# Patient Record
Sex: FEMALE | Race: WHITE | NOT HISPANIC OR LATINO | Employment: OTHER | URBAN - METROPOLITAN AREA
[De-identification: names, ages, dates, MRNs, and addresses within clinical notes are randomized per-mention and may not be internally consistent; named-entity substitution may affect disease eponyms.]

---

## 2025-07-05 ENCOUNTER — APPOINTMENT (OUTPATIENT)
Dept: RADIOLOGY | Facility: HOSPITAL | Age: OVER 89
DRG: 536 | End: 2025-07-05
Payer: MEDICARE

## 2025-07-05 ENCOUNTER — APPOINTMENT (OUTPATIENT)
Dept: CARDIOLOGY | Facility: HOSPITAL | Age: OVER 89
DRG: 536 | End: 2025-07-05
Payer: MEDICARE

## 2025-07-05 ENCOUNTER — HOSPITAL ENCOUNTER (OUTPATIENT)
Facility: HOSPITAL | Age: OVER 89
Setting detail: OBSERVATION
End: 2025-07-05
Attending: EMERGENCY MEDICINE | Admitting: INTERNAL MEDICINE
Payer: MEDICARE

## 2025-07-05 DIAGNOSIS — N17.9 AKI (ACUTE KIDNEY INJURY): ICD-10-CM

## 2025-07-05 DIAGNOSIS — I10 ESSENTIAL HYPERTENSION: ICD-10-CM

## 2025-07-05 DIAGNOSIS — R79.89 ELEVATED TROPONIN: ICD-10-CM

## 2025-07-05 DIAGNOSIS — S32.592A CLOSED FRACTURE OF MULTIPLE PUBIC RAMI, LEFT, INITIAL ENCOUNTER: Primary | ICD-10-CM

## 2025-07-05 LAB
ALBUMIN SERPL BCP-MCNC: 3.8 G/DL (ref 3.4–5)
ALP SERPL-CCNC: 46 U/L (ref 33–136)
ALT SERPL W P-5'-P-CCNC: 24 U/L (ref 7–45)
ANION GAP SERPL CALC-SCNC: 14 MMOL/L (ref 10–20)
APPEARANCE UR: CLEAR
AST SERPL W P-5'-P-CCNC: 30 U/L (ref 9–39)
BASOPHILS # BLD AUTO: 0.05 X10*3/UL (ref 0–0.1)
BASOPHILS NFR BLD AUTO: 0.6 %
BILIRUB SERPL-MCNC: 0.5 MG/DL (ref 0–1.2)
BILIRUB UR STRIP.AUTO-MCNC: NEGATIVE MG/DL
BNP SERPL-MCNC: 87 PG/ML (ref 0–99)
BUN SERPL-MCNC: 26 MG/DL (ref 6–23)
CALCIUM SERPL-MCNC: 8.2 MG/DL (ref 8.6–10.3)
CARDIAC TROPONIN I PNL SERPL HS: 16 NG/L (ref 0–13)
CARDIAC TROPONIN I PNL SERPL HS: 16 NG/L (ref 0–13)
CHLORIDE SERPL-SCNC: 106 MMOL/L (ref 98–107)
CK SERPL-CCNC: 214 U/L (ref 0–215)
CO2 SERPL-SCNC: 22 MMOL/L (ref 21–32)
COLOR UR: COLORLESS
CREAT SERPL-MCNC: 0.95 MG/DL (ref 0.5–1.05)
EGFRCR SERPLBLD CKD-EPI 2021: 55 ML/MIN/1.73M*2
EOSINOPHIL # BLD AUTO: 0.08 X10*3/UL (ref 0–0.4)
EOSINOPHIL NFR BLD AUTO: 0.9 %
ERYTHROCYTE [DISTWIDTH] IN BLOOD BY AUTOMATED COUNT: 12.4 % (ref 11.5–14.5)
GLUCOSE SERPL-MCNC: 84 MG/DL (ref 74–99)
GLUCOSE UR STRIP.AUTO-MCNC: NORMAL MG/DL
HCT VFR BLD AUTO: 33.5 % (ref 36–46)
HGB BLD-MCNC: 10.7 G/DL (ref 12–16)
IMM GRANULOCYTES # BLD AUTO: 0.12 X10*3/UL (ref 0–0.5)
IMM GRANULOCYTES NFR BLD AUTO: 1.3 % (ref 0–0.9)
KETONES UR STRIP.AUTO-MCNC: NEGATIVE MG/DL
LEUKOCYTE ESTERASE UR QL STRIP.AUTO: NEGATIVE
LYMPHOCYTES # BLD AUTO: 0.89 X10*3/UL (ref 0.8–3)
LYMPHOCYTES NFR BLD AUTO: 9.9 %
MAGNESIUM SERPL-MCNC: 2.06 MG/DL (ref 1.6–2.4)
MCH RBC QN AUTO: 31.3 PG (ref 26–34)
MCHC RBC AUTO-ENTMCNC: 31.9 G/DL (ref 32–36)
MCV RBC AUTO: 98 FL (ref 80–100)
MONOCYTES # BLD AUTO: 0.41 X10*3/UL (ref 0.05–0.8)
MONOCYTES NFR BLD AUTO: 4.6 %
NEUTROPHILS # BLD AUTO: 7.42 X10*3/UL (ref 1.6–5.5)
NEUTROPHILS NFR BLD AUTO: 82.7 %
NITRITE UR QL STRIP.AUTO: NEGATIVE
NRBC BLD-RTO: 0 /100 WBCS (ref 0–0)
PH UR STRIP.AUTO: 6.5 [PH]
PLATELET # BLD AUTO: 210 X10*3/UL (ref 150–450)
POTASSIUM SERPL-SCNC: 3.8 MMOL/L (ref 3.5–5.3)
PROT SERPL-MCNC: 6.7 G/DL (ref 6.4–8.2)
PROT UR STRIP.AUTO-MCNC: ABNORMAL MG/DL
RBC # BLD AUTO: 3.42 X10*6/UL (ref 4–5.2)
RBC # UR STRIP.AUTO: NEGATIVE MG/DL
RBC #/AREA URNS AUTO: NORMAL /HPF
SODIUM SERPL-SCNC: 138 MMOL/L (ref 136–145)
SP GR UR STRIP.AUTO: 1.01
UROBILINOGEN UR STRIP.AUTO-MCNC: NORMAL MG/DL
WBC # BLD AUTO: 9 X10*3/UL (ref 4.4–11.3)
WBC #/AREA URNS AUTO: NORMAL /HPF

## 2025-07-05 PROCEDURE — 93005 ELECTROCARDIOGRAM TRACING: CPT

## 2025-07-05 PROCEDURE — 85025 COMPLETE CBC W/AUTO DIFF WBC: CPT | Performed by: EMERGENCY MEDICINE

## 2025-07-05 PROCEDURE — 73564 X-RAY EXAM KNEE 4 OR MORE: CPT | Mod: 50

## 2025-07-05 PROCEDURE — 82550 ASSAY OF CK (CPK): CPT | Performed by: EMERGENCY MEDICINE

## 2025-07-05 PROCEDURE — 71260 CT THORAX DX C+: CPT | Mod: FOREIGN READ | Performed by: RADIOLOGY

## 2025-07-05 PROCEDURE — 70450 CT HEAD/BRAIN W/O DYE: CPT

## 2025-07-05 PROCEDURE — 2500000001 HC RX 250 WO HCPCS SELF ADMINISTERED DRUGS (ALT 637 FOR MEDICARE OP): Performed by: INTERNAL MEDICINE

## 2025-07-05 PROCEDURE — 36415 COLL VENOUS BLD VENIPUNCTURE: CPT | Performed by: EMERGENCY MEDICINE

## 2025-07-05 PROCEDURE — 84484 ASSAY OF TROPONIN QUANT: CPT | Performed by: EMERGENCY MEDICINE

## 2025-07-05 PROCEDURE — 99285 EMERGENCY DEPT VISIT HI MDM: CPT | Mod: 25 | Performed by: EMERGENCY MEDICINE

## 2025-07-05 PROCEDURE — 72125 CT NECK SPINE W/O DYE: CPT | Performed by: RADIOLOGY

## 2025-07-05 PROCEDURE — 2500000004 HC RX 250 GENERAL PHARMACY W/ HCPCS (ALT 636 FOR OP/ED): Performed by: INTERNAL MEDICINE

## 2025-07-05 PROCEDURE — 83880 ASSAY OF NATRIURETIC PEPTIDE: CPT | Performed by: EMERGENCY MEDICINE

## 2025-07-05 PROCEDURE — 71046 X-RAY EXAM CHEST 2 VIEWS: CPT

## 2025-07-05 PROCEDURE — 81001 URINALYSIS AUTO W/SCOPE: CPT | Performed by: EMERGENCY MEDICINE

## 2025-07-05 PROCEDURE — 72125 CT NECK SPINE W/O DYE: CPT

## 2025-07-05 PROCEDURE — 96374 THER/PROPH/DIAG INJ IV PUSH: CPT

## 2025-07-05 PROCEDURE — 96375 TX/PRO/DX INJ NEW DRUG ADDON: CPT

## 2025-07-05 PROCEDURE — 73552 X-RAY EXAM OF FEMUR 2/>: CPT | Mod: LT

## 2025-07-05 PROCEDURE — 73502 X-RAY EXAM HIP UNI 2-3 VIEWS: CPT | Mod: LT

## 2025-07-05 PROCEDURE — 74177 CT ABD & PELVIS W/CONTRAST: CPT | Mod: FOREIGN READ | Performed by: RADIOLOGY

## 2025-07-05 PROCEDURE — 99223 1ST HOSP IP/OBS HIGH 75: CPT | Performed by: ORTHOPAEDIC SURGERY

## 2025-07-05 PROCEDURE — 71260 CT THORAX DX C+: CPT

## 2025-07-05 PROCEDURE — 80053 COMPREHEN METABOLIC PANEL: CPT | Performed by: EMERGENCY MEDICINE

## 2025-07-05 PROCEDURE — 73610 X-RAY EXAM OF ANKLE: CPT | Mod: 50

## 2025-07-05 PROCEDURE — 70450 CT HEAD/BRAIN W/O DYE: CPT | Performed by: RADIOLOGY

## 2025-07-05 PROCEDURE — 73502 X-RAY EXAM HIP UNI 2-3 VIEWS: CPT | Mod: LEFT SIDE | Performed by: RADIOLOGY

## 2025-07-05 PROCEDURE — 71046 X-RAY EXAM CHEST 2 VIEWS: CPT | Mod: FOREIGN READ | Performed by: RADIOLOGY

## 2025-07-05 PROCEDURE — 83735 ASSAY OF MAGNESIUM: CPT | Performed by: EMERGENCY MEDICINE

## 2025-07-05 PROCEDURE — 2500000004 HC RX 250 GENERAL PHARMACY W/ HCPCS (ALT 636 FOR OP/ED): Performed by: EMERGENCY MEDICINE

## 2025-07-05 PROCEDURE — 1200000002 HC GENERAL ROOM WITH TELEMETRY DAILY

## 2025-07-05 PROCEDURE — 73552 X-RAY EXAM OF FEMUR 2/>: CPT | Mod: LEFT SIDE | Performed by: RADIOLOGY

## 2025-07-05 PROCEDURE — 73564 X-RAY EXAM KNEE 4 OR MORE: CPT | Mod: BILATERAL PROCEDURE | Performed by: RADIOLOGY

## 2025-07-05 PROCEDURE — 2550000001 HC RX 255 CONTRASTS: Performed by: EMERGENCY MEDICINE

## 2025-07-05 PROCEDURE — 73610 X-RAY EXAM OF ANKLE: CPT | Mod: BILATERAL PROCEDURE | Performed by: RADIOLOGY

## 2025-07-05 RX ORDER — HYDROMORPHONE HYDROCHLORIDE 0.2 MG/ML
0.2 INJECTION INTRAMUSCULAR; INTRAVENOUS; SUBCUTANEOUS ONCE
Status: COMPLETED | OUTPATIENT
Start: 2025-07-05 | End: 2025-07-05

## 2025-07-05 RX ORDER — HYDRALAZINE HYDROCHLORIDE 20 MG/ML
10 INJECTION INTRAMUSCULAR; INTRAVENOUS ONCE
Status: COMPLETED | OUTPATIENT
Start: 2025-07-05 | End: 2025-07-05

## 2025-07-05 RX ORDER — ACETAMINOPHEN 650 MG/1
650 SUPPOSITORY RECTAL EVERY 4 HOURS PRN
Status: ACTIVE | OUTPATIENT
Start: 2025-07-05

## 2025-07-05 RX ORDER — BISOPROLOL FUMARATE 5 MG/1
5 TABLET, FILM COATED ORAL DAILY
Status: DISPENSED | OUTPATIENT
Start: 2025-07-05

## 2025-07-05 RX ORDER — PANTOPRAZOLE SODIUM 40 MG/1
40 TABLET, DELAYED RELEASE ORAL
Status: ACTIVE | OUTPATIENT
Start: 2025-07-06

## 2025-07-05 RX ORDER — LATANOPROST 50 UG/ML
1 SOLUTION/ DROPS OPHTHALMIC NIGHTLY
Status: DISPENSED | OUTPATIENT
Start: 2025-07-05

## 2025-07-05 RX ORDER — VIT C/E/ZN/COPPR/LUTEIN/ZEAXAN 250MG-90MG
25 CAPSULE ORAL DAILY
Status: ON HOLD | COMMUNITY

## 2025-07-05 RX ORDER — OXYCODONE HYDROCHLORIDE 5 MG/1
5 TABLET ORAL EVERY 6 HOURS PRN
Refills: 0 | Status: DISPENSED | OUTPATIENT
Start: 2025-07-05

## 2025-07-05 RX ORDER — ONDANSETRON HYDROCHLORIDE 2 MG/ML
4 INJECTION, SOLUTION INTRAVENOUS EVERY 8 HOURS PRN
Status: ACTIVE | OUTPATIENT
Start: 2025-07-05

## 2025-07-05 RX ORDER — LATANOPROST 50 UG/ML
1 SOLUTION/ DROPS OPHTHALMIC NIGHTLY
Status: ON HOLD | COMMUNITY

## 2025-07-05 RX ORDER — HYDROXYCHLOROQUINE SULFATE 200 MG/1
200 TABLET, FILM COATED ORAL DAILY
Status: ON HOLD | COMMUNITY

## 2025-07-05 RX ORDER — COLCHICINE 0.6 MG/1
0.6 TABLET ORAL DAILY
Status: DISPENSED | OUTPATIENT
Start: 2025-07-05

## 2025-07-05 RX ORDER — LISINOPRIL 20 MG/1
40 TABLET ORAL DAILY
Status: DISPENSED | OUTPATIENT
Start: 2025-07-06

## 2025-07-05 RX ORDER — CALCIUM CARBONATE 500(1250)
1 TABLET ORAL DAILY
Status: ON HOLD | COMMUNITY
Start: 2017-04-25

## 2025-07-05 RX ORDER — AMLODIPINE BESYLATE 10 MG/1
10 TABLET ORAL
Status: ON HOLD | COMMUNITY

## 2025-07-05 RX ORDER — LISINOPRIL 40 MG/1
40 TABLET ORAL DAILY
Status: ON HOLD | COMMUNITY

## 2025-07-05 RX ORDER — HYDROXYCHLOROQUINE SULFATE 200 MG/1
200 TABLET, FILM COATED ORAL DAILY
Status: DISPENSED | OUTPATIENT
Start: 2025-07-05

## 2025-07-05 RX ORDER — PANTOPRAZOLE SODIUM 40 MG/10ML
40 INJECTION, POWDER, LYOPHILIZED, FOR SOLUTION INTRAVENOUS
Status: DISPENSED | OUTPATIENT
Start: 2025-07-06

## 2025-07-05 RX ORDER — HEPARIN SODIUM 5000 [USP'U]/ML
5000 INJECTION, SOLUTION INTRAVENOUS; SUBCUTANEOUS EVERY 8 HOURS SCHEDULED
Status: DISCONTINUED | OUTPATIENT
Start: 2025-07-05 | End: 2025-07-05

## 2025-07-05 RX ORDER — ALENDRONATE SODIUM 70 MG/1
70 TABLET ORAL
Status: ON HOLD | COMMUNITY
Start: 2017-04-25

## 2025-07-05 RX ORDER — HYDRALAZINE HYDROCHLORIDE 20 MG/ML
10 INJECTION INTRAMUSCULAR; INTRAVENOUS EVERY 6 HOURS PRN
Status: DISPENSED | OUTPATIENT
Start: 2025-07-05

## 2025-07-05 RX ORDER — RISPERIDONE 0.5 MG/1
0.5 TABLET, ORALLY DISINTEGRATING ORAL DAILY
Status: ON HOLD | COMMUNITY

## 2025-07-05 RX ORDER — ACETAMINOPHEN 160 MG/5ML
650 SOLUTION ORAL EVERY 4 HOURS PRN
Status: ACTIVE | OUTPATIENT
Start: 2025-07-05

## 2025-07-05 RX ORDER — AMLODIPINE BESYLATE 10 MG/1
10 TABLET ORAL DAILY
Status: DISPENSED | OUTPATIENT
Start: 2025-07-05

## 2025-07-05 RX ORDER — ACETAMINOPHEN 325 MG/1
650 TABLET ORAL EVERY 4 HOURS PRN
Status: ACTIVE | OUTPATIENT
Start: 2025-07-05

## 2025-07-05 RX ORDER — ONDANSETRON 4 MG/1
4 TABLET, FILM COATED ORAL EVERY 8 HOURS PRN
Status: ACTIVE | OUTPATIENT
Start: 2025-07-05

## 2025-07-05 RX ORDER — HEPARIN SODIUM 5000 [USP'U]/ML
5000 INJECTION, SOLUTION INTRAVENOUS; SUBCUTANEOUS EVERY 12 HOURS SCHEDULED
Status: DISPENSED | OUTPATIENT
Start: 2025-07-05

## 2025-07-05 RX ADMIN — OXYCODONE HYDROCHLORIDE 5 MG: 5 TABLET ORAL at 21:47

## 2025-07-05 RX ADMIN — LATANOPROST 1 DROP: 50 SOLUTION/ DROPS OPHTHALMIC at 22:44

## 2025-07-05 RX ADMIN — COLCHICINE 0.6 MG: 0.6 TABLET, FILM COATED ORAL at 22:29

## 2025-07-05 RX ADMIN — HYDRALAZINE HYDROCHLORIDE 10 MG: 20 INJECTION INTRAMUSCULAR; INTRAVENOUS at 19:02

## 2025-07-05 RX ADMIN — BISOPROLOL FUMARATE 5 MG: 5 TABLET, FILM COATED ORAL at 22:29

## 2025-07-05 RX ADMIN — AMLODIPINE BESYLATE 10 MG: 10 TABLET ORAL at 21:44

## 2025-07-05 RX ADMIN — HEPARIN SODIUM 5000 UNITS: 5000 INJECTION, SOLUTION INTRAVENOUS; SUBCUTANEOUS at 22:29

## 2025-07-05 RX ADMIN — HYDROMORPHONE HYDROCHLORIDE 0.2 MG: 0.2 INJECTION, SOLUTION INTRAMUSCULAR; INTRAVENOUS; SUBCUTANEOUS at 15:10

## 2025-07-05 RX ADMIN — IOHEXOL 100 ML: 350 INJECTION, SOLUTION INTRAVENOUS at 15:57

## 2025-07-05 RX ADMIN — HYDROXYCHLOROQUINE SULFATE 200 MG: 200 TABLET, FILM COATED ORAL at 22:34

## 2025-07-05 SDOH — SOCIAL STABILITY: SOCIAL INSECURITY: DO YOU FEEL ANYONE HAS EXPLOITED OR TAKEN ADVANTAGE OF YOU FINANCIALLY OR OF YOUR PERSONAL PROPERTY?: UNABLE TO ASSESS

## 2025-07-05 SDOH — SOCIAL STABILITY: SOCIAL INSECURITY: WITHIN THE LAST YEAR, HAVE YOU BEEN HUMILIATED OR EMOTIONALLY ABUSED IN OTHER WAYS BY YOUR PARTNER OR EX-PARTNER?: NO

## 2025-07-05 SDOH — SOCIAL STABILITY: SOCIAL INSECURITY: ABUSE: ADULT

## 2025-07-05 SDOH — SOCIAL STABILITY: SOCIAL INSECURITY: HAS ANYONE EVER THREATENED TO HURT YOUR FAMILY OR YOUR PETS?: UNABLE TO ASSESS

## 2025-07-05 SDOH — ECONOMIC STABILITY: FOOD INSECURITY: WITHIN THE PAST 12 MONTHS, YOU WORRIED THAT YOUR FOOD WOULD RUN OUT BEFORE YOU GOT THE MONEY TO BUY MORE.: NEVER TRUE

## 2025-07-05 SDOH — SOCIAL STABILITY: SOCIAL INSECURITY: WITHIN THE LAST YEAR, HAVE YOU BEEN AFRAID OF YOUR PARTNER OR EX-PARTNER?: NO

## 2025-07-05 SDOH — ECONOMIC STABILITY: FOOD INSECURITY: WITHIN THE PAST 12 MONTHS, THE FOOD YOU BOUGHT JUST DIDN'T LAST AND YOU DIDN'T HAVE MONEY TO GET MORE.: NEVER TRUE

## 2025-07-05 SDOH — SOCIAL STABILITY: SOCIAL INSECURITY
WITHIN THE LAST YEAR, HAVE YOU BEEN KICKED, HIT, SLAPPED, OR OTHERWISE PHYSICALLY HURT BY YOUR PARTNER OR EX-PARTNER?: NO

## 2025-07-05 SDOH — ECONOMIC STABILITY: INCOME INSECURITY: IN THE PAST 12 MONTHS HAS THE ELECTRIC, GAS, OIL, OR WATER COMPANY THREATENED TO SHUT OFF SERVICES IN YOUR HOME?: NO

## 2025-07-05 SDOH — SOCIAL STABILITY: SOCIAL INSECURITY: HAVE YOU HAD ANY THOUGHTS OF HARMING ANYONE ELSE?: UNABLE TO ASSESS

## 2025-07-05 SDOH — SOCIAL STABILITY: SOCIAL INSECURITY
WITHIN THE LAST YEAR, HAVE YOU BEEN RAPED OR FORCED TO HAVE ANY KIND OF SEXUAL ACTIVITY BY YOUR PARTNER OR EX-PARTNER?: NO

## 2025-07-05 SDOH — SOCIAL STABILITY: SOCIAL INSECURITY: HAVE YOU HAD THOUGHTS OF HARMING ANYONE ELSE?: NO

## 2025-07-05 SDOH — SOCIAL STABILITY: SOCIAL INSECURITY: DOES ANYONE TRY TO KEEP YOU FROM HAVING/CONTACTING OTHER FRIENDS OR DOING THINGS OUTSIDE YOUR HOME?: UNABLE TO ASSESS

## 2025-07-05 SDOH — SOCIAL STABILITY: SOCIAL INSECURITY: ARE THERE ANY APPARENT SIGNS OF INJURIES/BEHAVIORS THAT COULD BE RELATED TO ABUSE/NEGLECT?: NO

## 2025-07-05 SDOH — SOCIAL STABILITY: SOCIAL INSECURITY: ARE YOU OR HAVE YOU BEEN THREATENED OR ABUSED PHYSICALLY, EMOTIONALLY, OR SEXUALLY BY ANYONE?: UNABLE TO ASSESS

## 2025-07-05 SDOH — SOCIAL STABILITY: SOCIAL INSECURITY: WERE YOU ABLE TO COMPLETE ALL THE BEHAVIORAL HEALTH SCREENINGS?: NO

## 2025-07-05 SDOH — SOCIAL STABILITY: SOCIAL INSECURITY: DO YOU FEEL UNSAFE GOING BACK TO THE PLACE WHERE YOU ARE LIVING?: UNABLE TO ASSESS

## 2025-07-05 ASSESSMENT — COGNITIVE AND FUNCTIONAL STATUS - GENERAL
TURNING FROM BACK TO SIDE WHILE IN FLAT BAD: A LOT
TOILETING: A LOT
MOBILITY SCORE: 12
WALKING IN HOSPITAL ROOM: A LOT
PATIENT BASELINE BEDBOUND: NO
MOVING FROM LYING ON BACK TO SITTING ON SIDE OF FLAT BED WITH BEDRAILS: A LOT
DAILY ACTIVITIY SCORE: 16
EATING MEALS: A LITTLE
MOVING TO AND FROM BED TO CHAIR: A LOT
CLIMB 3 TO 5 STEPS WITH RAILING: A LOT
DRESSING REGULAR LOWER BODY CLOTHING: A LOT
HELP NEEDED FOR BATHING: A LITTLE
PERSONAL GROOMING: A LITTLE
DRESSING REGULAR UPPER BODY CLOTHING: A LITTLE
STANDING UP FROM CHAIR USING ARMS: A LOT

## 2025-07-05 ASSESSMENT — PAIN - FUNCTIONAL ASSESSMENT
PAIN_FUNCTIONAL_ASSESSMENT: 0-10
PAIN_FUNCTIONAL_ASSESSMENT: 0-10

## 2025-07-05 ASSESSMENT — ACTIVITIES OF DAILY LIVING (ADL)
PATIENT'S MEMORY ADEQUATE TO SAFELY COMPLETE DAILY ACTIVITIES?: NO
GROOMING: NEEDS ASSISTANCE
BATHING: NEEDS ASSISTANCE
TOILETING: NEEDS ASSISTANCE
HEARING - RIGHT EAR: FUNCTIONAL
JUDGMENT_ADEQUATE_SAFELY_COMPLETE_DAILY_ACTIVITIES: NO
DRESSING YOURSELF: NEEDS ASSISTANCE
FEEDING YOURSELF: NEEDS ASSISTANCE
LACK_OF_TRANSPORTATION: PATIENT DECLINED
WALKS IN HOME: NEEDS ASSISTANCE
ADEQUATE_TO_COMPLETE_ADL: YES
ASSISTIVE_DEVICE: WALKER
HEARING - LEFT EAR: FUNCTIONAL

## 2025-07-05 ASSESSMENT — ENCOUNTER SYMPTOMS
ENDOCRINE NEGATIVE: 1
ACTIVITY CHANGE: 1
PSYCHIATRIC NEGATIVE: 1
ALLERGIC/IMMUNOLOGIC NEGATIVE: 1
HEMATOLOGIC/LYMPHATIC NEGATIVE: 1
CARDIOVASCULAR NEGATIVE: 1
APPETITE CHANGE: 1
GASTROINTESTINAL NEGATIVE: 1
RESPIRATORY NEGATIVE: 1
EYES NEGATIVE: 1
FATIGUE: 1

## 2025-07-05 ASSESSMENT — LIFESTYLE VARIABLES
HOW MANY STANDARD DRINKS CONTAINING ALCOHOL DO YOU HAVE ON A TYPICAL DAY: PATIENT UNABLE TO ANSWER
SKIP TO QUESTIONS 9-10: 0
AUDIT-C TOTAL SCORE: -1
HOW OFTEN DO YOU HAVE 6 OR MORE DRINKS ON ONE OCCASION: PATIENT UNABLE TO ANSWER
AUDIT-C TOTAL SCORE: -1
HOW OFTEN DO YOU HAVE A DRINK CONTAINING ALCOHOL: PATIENT UNABLE TO ANSWER

## 2025-07-05 ASSESSMENT — PATIENT HEALTH QUESTIONNAIRE - PHQ9
SUM OF ALL RESPONSES TO PHQ9 QUESTIONS 1 & 2: 0
2. FEELING DOWN, DEPRESSED OR HOPELESS: NOT AT ALL
1. LITTLE INTEREST OR PLEASURE IN DOING THINGS: NOT AT ALL

## 2025-07-05 ASSESSMENT — PAIN SCALES - GENERAL
PAINLEVEL_OUTOF10: 2
PAINLEVEL_OUTOF10: 10 - WORST POSSIBLE PAIN

## 2025-07-05 ASSESSMENT — PAIN DESCRIPTION - PROGRESSION: CLINICAL_PROGRESSION: NOT CHANGED

## 2025-07-05 NOTE — CONSULTS
ORTHOPAEDIC CONSULTATION     History Of Present Illness  Belén Chen is a 96 y.o. female with past medical history of dementia presenting after unwitnessed fall at nursing home.  At baseline, patient is oriented to name and does sometimes answer questions and follows commands.  Patient previously in day endorsed pain localized to the left hip.  Denies pain in the bilateral knees or ankles.  States that she ambulates, unable to elaborate further.    Review of Systems: 12 point ROS negative unless stated in HPI    Past Medical History  She has no past medical history on file.    Surgical History  She has no past surgical history on file.     Social History  She has no history on file for tobacco use, alcohol use, and drug use.    Family History  Family History[1]     Allergies  Patient has no allergy information on record.    Review of Systems  12 point ROS negative unless stated in HPI     Physical Exam  GEN - NAD, resting comfortably in hospital bed  HEENT - MMM, EOMI, NCAT  CV - RRR by peripheral palpation, limbs wwp  PULM - NWOB  NEURO - EPSTEIN spontaneously, CNs II - XII grossly intact  PSYCH - Appropriate mood and affect      Pelvis:  - Nontender over left hip  - Skin intact  - No bony deformity, skin tenting, bruising  - 5/5 motor and SILT in all distributions in BL LE      A full secondary survey of all four extremities was performed and the significant orthopedic findings are noted above.     Last Recorded Vitals  Blood pressure (!) 192/73, pulse (!) 102, temperature 36.5 °C (97.7 °F), resp. rate 16, SpO2 96%.    Imaging:  X-ray of the pelvis demonstrates left superior and inferior pubic rami fractures.  Bilateral total hip arthroplasties.  Prior left greater trochanteric nonunion, chronic appearing.    CT the pelvis demonstrates the above with no associated sacral fracture.     Assessment/Plan   96-year-old female presenting after unwitnessed fall with left incomplete LC 1 pelvic ring fracture.    Plan:  -  No acute orthopaedic surgical intervention, fractures are stable and will heal uneventfully over 6 weeks  - WBAT bilateral lower extremities with walker as tolerated  - DVT ppx per primary  - Patient to follow up in clinic with Dr. Kovacs in 6 weeks or as needed, call [phone] to schedule appointment after discharge.    Consult seen and staffed within 30 minutes of notification.    Consult discussed with attending, Dr. Bethanie Agudelo MD, PGY-3  Orthopaedic Surgery   Available via [employer] Chat    I saw and evaluated the patient. I personally obtained the key and critical portions of the history and physical exam or was physically present for key and critical portions performed by the resident/fellow. I reviewed the resident/fellow's documentation and discussed the patient with the resident/fellow. I agree with the resident/fellow's medical decision making as documented in the note.   Briefly, this is a 96 year old woman who was found down behind the door at living facility and brought into ED.  On exam, foot pulses intact, able to DF/PF/wiggle toes.  No pain over medial or lateral malleoli bilaterally.  Full ankle motion bilaterally, No pain over total knee replacements.  Mild groin pain on left.  No pain with logroll hip.  No upper extremity pain.      DATA review:  I personally reviewed the xray images and reports of the left ankle: NO acute fracture of medial malleolus (chronic calcification in deltoid attachment, not an acute fracture line and patient not tender clinically and not swollen there), NO acute fracture of distal fibula (chronic fibular ossicle present which is well rounded and not an acute fracture line, patient with no pain over fibula clinically).      I personally reviewed the patient's xray reports and images of her hips showing previous nonunion greater troch on left with intact prosthesis, R NONA in place, no periprosthetic fracture.      I personally reviewed the xray and CT  images and reports of pelvis showing nondisplaced pubic rami fracture sup and inf and CT scan no sacral involvement.      I personally reviewed the patient's xray images and reports of both knees showing intact total knee arthroplasties, no periprosthetic fracture and no loosening.    Assessment: left superior and inferior pubic rami fractures, arthroplasties in place with no acute complications, bilateral ankles with chronic ossification med mall and distal fibular ossicle (clinically asymptomatic).  Recommend WBAT BLE with PT and OT evaluation.  No surgery needed.  Rami fractures will heal over 6-8 weeks.      Hermelinda Kovacs MD          [1] No family history on file.

## 2025-07-05 NOTE — ED TRIAGE NOTES
Unwitnessed fall at facility. Alert to self at baseline. Complaining of left hip pain. Hx bilateral hip replacements

## 2025-07-05 NOTE — ED PROVIDER NOTES
HPI   Chief Complaint   Patient presents with   • Fall       HPI        Patient History   Medical History[1]  Surgical History[2]  Family History[3]  Social History[4]    Physical Exam   ED Triage Vitals [07/05/25 1251]   Temperature Heart Rate Respirations BP   36.5 °C (97.7 °F) 71 18 (!) 209/81      Pulse Ox Temp src Heart Rate Source Patient Position   97 % -- -- --      BP Location FiO2 (%)     -- --       Physical Exam      ED Course & MDM   ED Course as of 07/05/25 1441   Sat Jul 05, 2025   1348 Attempted to contact patient's nursing home, was on hold for approximately 15 minutes without answer.  Will attempt to recontact nursing facility. [BR]      ED Course User Index  [BR] Umer Walker MD                 No data recorded     [place] Coma Scale Score: 14 (07/05/25 1251 : Kala Verdin, RORY)                           Medical Decision Making      Procedure  Procedures         [1]  No past medical history on file.  [2]  No past surgical history on file.  [3]  No family history on file.  [4]  Social History  Tobacco Use   • Smoking status: Not on file   • Smokeless tobacco: Not on file   Substance Use Topics   • Alcohol use: Not on file   • Drug use: Not on file      Palpations: Abdomen is soft.      Tenderness: There is no abdominal tenderness. There is no right CVA tenderness, left CVA tenderness, guarding or rebound.   Musculoskeletal:      Right shoulder: Normal.      Left shoulder: Normal.      Right upper arm: Normal.      Left upper arm: Normal.      Right elbow: Normal.      Left elbow: Normal.      Right forearm: Normal.      Left forearm: Normal.      Right wrist: Normal.      Left wrist: Normal.      Cervical back: Normal and full passive range of motion without pain.      Thoracic back: Normal.      Lumbar back: Normal.      Right hip: Normal.      Left hip: Tenderness present.      Right upper leg: Normal.      Left upper leg: Tenderness present.      Right lower leg: No edema.      Left lower leg: No edema.      Right ankle: Tenderness present.      Left ankle: Tenderness present.   Skin:     General: Skin is warm and dry.   Neurological:      General: No focal deficit present.      Mental Status: She is alert. Mental status is at baseline.      GCS: GCS eye subscore is 4. GCS verbal subscore is 5. GCS motor subscore is 6.      Cranial Nerves: No cranial nerve deficit.      Sensory: No sensory deficit.      Motor: No weakness, tremor or abnormal muscle tone.   Psychiatric:         Mood and Affect: Mood normal.           ED Course & MDM   ED Course as of 07/08/25 2145   Sat Jul 05, 2025   1348 Attempted to contact patient's nursing home, was on hold for approximately 15 minutes without answer.  Will attempt to recontact nursing facility. [BR]      ED Course User Index  [BR] Umer Walker MD         Diagnoses as of 07/08/25 2145   Closed fracture of multiple pubic rami, left, initial encounter   Elevated troponin   Essential hypertension                 No data recorded     [place] Coma Scale Score: 14 (07/05/25 1251 : Kaal Verdin RN)                           Medical Decision Making  ECG interpreted by me: Sinus rhythm, rate 98, , QTc 45.  Right bundle  branch block, no ST changes suggestive of ischemia      Patient presenting after an MI's fall at her nursing facility.  On examination has left hip tenderness, bilateral ankle tenderness, some left lower quadrant tenderness on exam.  Otherwise reassuring normal range vitals, appears to be neurovascularly intact in all 4 extremities.  Will assess for acute traumatic injury CT head, C-spine, chest abdomen pelvis, left femur/hip x-ray, bilateral ankle x-rays.  Will assess for precipitating dysrhythmia/metabolic abnormality/infectious etiology with cell count, CMP, troponin, ECG, urinalysis.  Patient's workup remarkable for superior and inferior left pubic rami fractures, otherwise largely reassuring.  Given patient's age and falls and concern for pain control/higher likelihood of fall, patient will be admitted to medicine for further evaluation and management, did consult orthopedic surgery prior to admission.  Admitted in stable condition.        Procedure  Procedures       [1] No past medical history on file.  [2] No past surgical history on file.  [3] No family history on file.  [4]   Social History  Tobacco Use    Smoking status: Not on file    Smokeless tobacco: Not on file   Substance Use Topics    Alcohol use: Not on file    Drug use: Not on file        Umer Walker MD  07/08/25 3238

## 2025-07-06 VITALS
SYSTOLIC BLOOD PRESSURE: 146 MMHG | HEART RATE: 77 BPM | OXYGEN SATURATION: 94 % | WEIGHT: 101.5 LBS | HEIGHT: 61 IN | RESPIRATION RATE: 18 BRPM | DIASTOLIC BLOOD PRESSURE: 61 MMHG | BODY MASS INDEX: 19.16 KG/M2 | TEMPERATURE: 98.2 F

## 2025-07-06 PROBLEM — S32.592A PUBIC RAMUS FRACTURE, LEFT, CLOSED, INITIAL ENCOUNTER (MULTI): Status: ACTIVE | Noted: 2025-07-06

## 2025-07-06 LAB
ANION GAP SERPL CALC-SCNC: 14 MMOL/L (ref 10–20)
BUN SERPL-MCNC: 27 MG/DL (ref 6–23)
CALCIUM SERPL-MCNC: 8.4 MG/DL (ref 8.6–10.3)
CHLORIDE SERPL-SCNC: 107 MMOL/L (ref 98–107)
CO2 SERPL-SCNC: 22 MMOL/L (ref 21–32)
CREAT SERPL-MCNC: 1.2 MG/DL (ref 0.5–1.05)
EGFRCR SERPLBLD CKD-EPI 2021: 42 ML/MIN/1.73M*2
ERYTHROCYTE [DISTWIDTH] IN BLOOD BY AUTOMATED COUNT: 12.7 % (ref 11.5–14.5)
GLUCOSE SERPL-MCNC: 102 MG/DL (ref 74–99)
HCT VFR BLD AUTO: 32.6 % (ref 36–46)
HGB BLD-MCNC: 10.5 G/DL (ref 12–16)
MCH RBC QN AUTO: 31 PG (ref 26–34)
MCHC RBC AUTO-ENTMCNC: 32.2 G/DL (ref 32–36)
MCV RBC AUTO: 96 FL (ref 80–100)
NRBC BLD-RTO: 0 /100 WBCS (ref 0–0)
PLATELET # BLD AUTO: 202 X10*3/UL (ref 150–450)
POTASSIUM SERPL-SCNC: 4.6 MMOL/L (ref 3.5–5.3)
RBC # BLD AUTO: 3.39 X10*6/UL (ref 4–5.2)
SODIUM SERPL-SCNC: 138 MMOL/L (ref 136–145)
WBC # BLD AUTO: 13.1 X10*3/UL (ref 4.4–11.3)

## 2025-07-06 PROCEDURE — 80048 BASIC METABOLIC PNL TOTAL CA: CPT | Performed by: INTERNAL MEDICINE

## 2025-07-06 PROCEDURE — 36415 COLL VENOUS BLD VENIPUNCTURE: CPT | Performed by: INTERNAL MEDICINE

## 2025-07-06 PROCEDURE — 2500000004 HC RX 250 GENERAL PHARMACY W/ HCPCS (ALT 636 FOR OP/ED): Performed by: INTERNAL MEDICINE

## 2025-07-06 PROCEDURE — G0378 HOSPITAL OBSERVATION PER HR: HCPCS

## 2025-07-06 PROCEDURE — 99232 SBSQ HOSP IP/OBS MODERATE 35: CPT | Performed by: INTERNAL MEDICINE

## 2025-07-06 PROCEDURE — 85027 COMPLETE CBC AUTOMATED: CPT | Performed by: INTERNAL MEDICINE

## 2025-07-06 PROCEDURE — 2500000001 HC RX 250 WO HCPCS SELF ADMINISTERED DRUGS (ALT 637 FOR MEDICARE OP): Performed by: INTERNAL MEDICINE

## 2025-07-06 PROCEDURE — 96372 THER/PROPH/DIAG INJ SC/IM: CPT | Performed by: INTERNAL MEDICINE

## 2025-07-06 PROCEDURE — 97161 PT EVAL LOW COMPLEX 20 MIN: CPT | Mod: GP

## 2025-07-06 RX ADMIN — HYDRALAZINE HYDROCHLORIDE 10 MG: 20 INJECTION INTRAMUSCULAR; INTRAVENOUS at 01:07

## 2025-07-06 RX ADMIN — HYDRALAZINE HYDROCHLORIDE 10 MG: 20 INJECTION INTRAMUSCULAR; INTRAVENOUS at 16:06

## 2025-07-06 RX ADMIN — LATANOPROST 1 DROP: 50 SOLUTION/ DROPS OPHTHALMIC at 21:08

## 2025-07-06 RX ADMIN — HEPARIN SODIUM 5000 UNITS: 5000 INJECTION, SOLUTION INTRAVENOUS; SUBCUTANEOUS at 08:58

## 2025-07-06 RX ADMIN — BISOPROLOL FUMARATE 5 MG: 5 TABLET, FILM COATED ORAL at 09:29

## 2025-07-06 RX ADMIN — PANTOPRAZOLE SODIUM 40 MG: 40 INJECTION, POWDER, FOR SOLUTION INTRAVENOUS at 06:18

## 2025-07-06 RX ADMIN — HEPARIN SODIUM 5000 UNITS: 5000 INJECTION, SOLUTION INTRAVENOUS; SUBCUTANEOUS at 21:08

## 2025-07-06 RX ADMIN — LISINOPRIL 40 MG: 20 TABLET ORAL at 08:58

## 2025-07-06 RX ADMIN — AMLODIPINE BESYLATE 10 MG: 10 TABLET ORAL at 09:29

## 2025-07-06 RX ADMIN — COLCHICINE 0.6 MG: 0.6 TABLET, FILM COATED ORAL at 09:29

## 2025-07-06 RX ADMIN — OXYCODONE HYDROCHLORIDE 5 MG: 5 TABLET ORAL at 03:51

## 2025-07-06 RX ADMIN — HYDROXYCHLOROQUINE SULFATE 200 MG: 200 TABLET, FILM COATED ORAL at 09:29

## 2025-07-06 ASSESSMENT — PAIN SCALES - PAIN ASSESSMENT IN ADVANCED DEMENTIA (PAINAD)
BODYLANGUAGE: RELAXED
CONSOLABILITY: NO NEED TO CONSOLE
BREATHING: NORMAL
CONSOLABILITY: UNABLE TO CONSOLE, DISTRACT OR REASSURE
TOTALSCORE: 7
NEGVOCALIZATION: REPEATED TROUBLED CALLING OUT, LOUD MOANING/GROANING, CRYING
BREATHING: NORMAL
BODYLANGUAGE: TENSE, DISTRESSED PACING, FIDGETING
NEGVOCALIZATION: OCCASIONAL MOAN/GROAN, LOW SPEECH, NEGATIVE/DISAPPROVING QUALITY
FACIALEXPRESSION: FACIAL GRIMACING
TOTALSCORE: MEDICATION (SEE MAR)
FACIALEXPRESSION: SMILING OR INEXPRESSIVE
TOTALSCORE: 1

## 2025-07-06 ASSESSMENT — COGNITIVE AND FUNCTIONAL STATUS - GENERAL
STANDING UP FROM CHAIR USING ARMS: A LOT
DRESSING REGULAR LOWER BODY CLOTHING: A LOT
WALKING IN HOSPITAL ROOM: TOTAL
DAILY ACTIVITIY SCORE: 16
STANDING UP FROM CHAIR USING ARMS: A LOT
DRESSING REGULAR UPPER BODY CLOTHING: A LOT
DAILY ACTIVITIY SCORE: 14
MOVING FROM LYING ON BACK TO SITTING ON SIDE OF FLAT BED WITH BEDRAILS: A LOT
CLIMB 3 TO 5 STEPS WITH RAILING: TOTAL
STANDING UP FROM CHAIR USING ARMS: A LOT
HELP NEEDED FOR BATHING: A LITTLE
MOVING FROM LYING ON BACK TO SITTING ON SIDE OF FLAT BED WITH BEDRAILS: A LOT
WALKING IN HOSPITAL ROOM: A LOT
PERSONAL GROOMING: A LITTLE
MOVING TO AND FROM BED TO CHAIR: A LOT
WALKING IN HOSPITAL ROOM: A LOT
EATING MEALS: A LITTLE
CLIMB 3 TO 5 STEPS WITH RAILING: TOTAL
PERSONAL GROOMING: A LITTLE
MOVING FROM LYING ON BACK TO SITTING ON SIDE OF FLAT BED WITH BEDRAILS: A LOT
TOILETING: A LOT
MOBILITY SCORE: 12
HELP NEEDED FOR BATHING: A LOT
MOBILITY SCORE: 11
TURNING FROM BACK TO SIDE WHILE IN FLAT BAD: A LOT
TURNING FROM BACK TO SIDE WHILE IN FLAT BAD: A LOT
DRESSING REGULAR LOWER BODY CLOTHING: A LOT
EATING MEALS: A LITTLE
MOVING TO AND FROM BED TO CHAIR: TOTAL
DRESSING REGULAR UPPER BODY CLOTHING: A LITTLE
MOVING TO AND FROM BED TO CHAIR: A LOT
MOBILITY SCORE: 9
CLIMB 3 TO 5 STEPS WITH RAILING: A LOT
TOILETING: A LOT
TURNING FROM BACK TO SIDE WHILE IN FLAT BAD: A LOT

## 2025-07-06 ASSESSMENT — PAIN - FUNCTIONAL ASSESSMENT
PAIN_FUNCTIONAL_ASSESSMENT: 0-10
PAIN_FUNCTIONAL_ASSESSMENT: PAINAD (PAIN ASSESSMENT IN ADVANCED DEMENTIA SCALE)
PAIN_FUNCTIONAL_ASSESSMENT: WONG-BAKER FACES

## 2025-07-06 ASSESSMENT — PAIN SCALES - GENERAL
PAINLEVEL_OUTOF10: 0 - NO PAIN
PAINLEVEL_OUTOF10: 0 - NO PAIN

## 2025-07-06 NOTE — H&P
History Of Present Illness  Belén Chen is a 96 y.o. female with a past medical history of hypertension, metabolic encephalopathy, dementia who presented to [facility]note 3] from a nursing facility after an unwitnessed fall.  X-rays of the pelvis and CT of the pelvis demonstrate left superior and inferior pubic rami fractures.  Bilateral total hip arthroplasties prior left greater trochanteric nonunion, chronic appearing     Past Medical History  Osteoarthritis  Cancer  Difficulty walking  Edema  Glaucoma both eyes  Gout  Hypertension  Metabolic encephalopathy  Onychomycosis  Osteoporosis  Pain in her feet  Pseudophakia both eyes    Surgical History  Bilateral total hip arthroplasties     Social History  She has no history on file for tobacco use, alcohol use, and drug use.    Family History  CAD     Allergies  Patient has no known allergies.    Review of Systems   Constitutional:  Positive for activity change, appetite change and fatigue.   HENT: Negative.     Eyes: Negative.    Respiratory: Negative.     Cardiovascular: Negative.    Gastrointestinal: Negative.    Endocrine: Negative.    Genitourinary:  Positive for pelvic pain.   Musculoskeletal:  Positive for gait problem.        Pelvic pain    Skin: Negative.    Allergic/Immunologic: Negative.    Hematological: Negative.    Psychiatric/Behavioral: Negative.     All other systems reviewed and are negative.       Physical Exam  Vitals and nursing note reviewed.   Constitutional:       Appearance: Normal appearance.      Comments: She follows simple commands   HENT:      Head: Normocephalic.      Right Ear: External ear normal.      Left Ear: External ear normal.      Nose: Nose normal.      Mouth/Throat:      Mouth: Mucous membranes are dry.      Pharynx: Oropharynx is clear.   Eyes:      Extraocular Movements: Extraocular movements intact.      Conjunctiva/sclera: Conjunctivae normal.      Pupils: Pupils are equal, round, and reactive to light.    Cardiovascular:      Rate and Rhythm: Normal rate and regular rhythm.   Pulmonary:      Effort: Pulmonary effort is normal.      Breath sounds: Normal breath sounds.   Abdominal:      General: Abdomen is flat. Bowel sounds are normal.      Palpations: Abdomen is soft.   Musculoskeletal:         General: Normal range of motion.      Comments: Hips are nontender   Skin:     General: Skin is warm and dry.   Neurological:      General: No focal deficit present.      Mental Status: She is alert. Mental status is at baseline.   Psychiatric:         Mood and Affect: Mood normal.         Behavior: Behavior normal.          Last Recorded Vitals  Blood pressure 174/62, pulse 89, temperature 36.5 °C (97.7 °F), resp. rate 18, SpO2 95%.    Relevant Results  Meds:  Scheduled medications  Scheduled Medications[1]  Continuous medications  Continuous Medications[2]  PRN medications  PRN Medications[3]   No current outpatient medications     Labs:  Results for orders placed or performed during the hospital encounter of 07/05/25 (from the past 24 hours)   CBC and Auto Differential   Result Value Ref Range    WBC 9.0 4.4 - 11.3 x10*3/uL    nRBC 0.0 0.0 - 0.0 /100 WBCs    RBC 3.42 (L) 4.00 - 5.20 x10*6/uL    Hemoglobin 10.7 (L) 12.0 - 16.0 g/dL    Hematocrit 33.5 (L) 36.0 - 46.0 %    MCV 98 80 - 100 fL    MCH 31.3 26.0 - 34.0 pg    MCHC 31.9 (L) 32.0 - 36.0 g/dL    RDW 12.4 11.5 - 14.5 %    Platelets 210 150 - 450 x10*3/uL    Neutrophils % 82.7 40.0 - 80.0 %    Immature Granulocytes %, Automated 1.3 (H) 0.0 - 0.9 %    Lymphocytes % 9.9 13.0 - 44.0 %    Monocytes % 4.6 2.0 - 10.0 %    Eosinophils % 0.9 0.0 - 6.0 %    Basophils % 0.6 0.0 - 2.0 %    Neutrophils Absolute 7.42 (H) 1.60 - 5.50 x10*3/uL    Immature Granulocytes Absolute, Automated 0.12 0.00 - 0.50 x10*3/uL    Lymphocytes Absolute 0.89 0.80 - 3.00 x10*3/uL    Monocytes Absolute 0.41 0.05 - 0.80 x10*3/uL    Eosinophils Absolute 0.08 0.00 - 0.40 x10*3/uL    Basophils Absolute  0.05 0.00 - 0.10 x10*3/uL   Magnesium   Result Value Ref Range    Magnesium 2.06 1.60 - 2.40 mg/dL   Comprehensive metabolic panel   Result Value Ref Range    Glucose 84 74 - 99 mg/dL    Sodium 138 136 - 145 mmol/L    Potassium 3.8 3.5 - 5.3 mmol/L    Chloride 106 98 - 107 mmol/L    Bicarbonate 22 21 - 32 mmol/L    Anion Gap 14 10 - 20 mmol/L    Urea Nitrogen 26 (H) 6 - 23 mg/dL    Creatinine 0.95 0.50 - 1.05 mg/dL    eGFR 55 (L) >60 mL/min/1.73m*2    Calcium 8.2 (L) 8.6 - 10.3 mg/dL    Albumin 3.8 3.4 - 5.0 g/dL    Alkaline Phosphatase 46 33 - 136 U/L    Total Protein 6.7 6.4 - 8.2 g/dL    AST 30 9 - 39 U/L    Bilirubin, Total 0.5 0.0 - 1.2 mg/dL    ALT 24 7 - 45 U/L   Creatine Kinase   Result Value Ref Range    Creatine Kinase 214 0 - 215 U/L   Troponin I, High Sensitivity, Initial   Result Value Ref Range    Troponin I, High Sensitivity 16 (H) 0 - 13 ng/L   B-type natriuretic peptide   Result Value Ref Range    BNP 87 0 - 99 pg/mL   Urinalysis with Reflex Culture and Microscopic   Result Value Ref Range    Color, Urine Colorless (N) Light-Yellow, Yellow, Dark-Yellow    Appearance, Urine Clear Clear    Specific Gravity, Urine 1.008 1.005 - 1.035    pH, Urine 6.5 5.0, 5.5, 6.0, 6.5, 7.0, 7.5, 8.0    Protein, Urine 20 (TRACE) NEGATIVE, 10 (TRACE), 20 (TRACE) mg/dL    Glucose, Urine Normal Normal mg/dL    Blood, Urine NEGATIVE NEGATIVE mg/dL    Ketones, Urine NEGATIVE NEGATIVE mg/dL    Bilirubin, Urine NEGATIVE NEGATIVE mg/dL    Urobilinogen, Urine Normal Normal mg/dL    Nitrite, Urine NEGATIVE NEGATIVE    Leukocyte Esterase, Urine NEGATIVE NEGATIVE   Troponin, High Sensitivity, 1 Hour   Result Value Ref Range    Troponin I, High Sensitivity 16 (H) 0 - 13 ng/L   Urinalysis Microscopic   Result Value Ref Range    WBC, Urine 1-5 1-5, NONE /HPF    RBC, Urine NONE NONE, 1-2, 3-5 /HPF      Imaging:  Imaging  CT chest abdomen pelvis w IV contrast  Result Date: 7/5/2025  Acute, traumatic, nondisplaced fractures of the left  superior and inferior pubic bones. No additional acute thoracic, abdominal, or pelvic trauma identified. Multiple bilateral pulmonary nodules measuring up to 5 mm.  Follow-up per Fleischner Society guidelines. Bilateral thyroid nodules and cysts measuring up to 1.3 cm. Nonemergent thyroid ultrasound is recommended for further characterization. Moderate-sized sliding-type hiatal hernia. Mild pulmonary edema. Atherosclerosis with suspected stenoses of the origins of the SMA and bilateral renal arteries with mild bilateral renal cortical thinning. Sigmoid diverticulosis. Additional chronic changes as detailed in the body of the report. Signed by Dada Montelongo    CT cervical spine wo IV contrast  Result Date: 7/5/2025  No acute cervical vertebral displacement or acute displaced fracture. Multilevel productive/degenerative changes with multilevel spondylolisthesis, as detailed above, similar to 09/18/2023.   MACRO: None.   Signed by: Elva Miller 7/5/2025 4:50 PM Dictation workstation:   [id number]    CT head wo IV contrast  Result Date: 7/5/2025  No acute intracranial hemorrhage or mass-effect.   MACRO: None.   Signed by: Elva Miller 7/5/2025 4:46 PM Dictation workstation:   [id number]    XR ankle bilateral complete minimum 3 views  Result Date: 7/5/2025  Acute avulsion fracture of the inferior portion of the medial malleolus of the left ankle. Bilateral plantar fasciitis. Signed by James Esteves MD    XR knee 4+ views bilateral  Result Date: 7/5/2025  1.No acute traumatic bony abnormalities on x-ray examination of the bilateral knees. 2.Bilateral knee prostheses in place. 3.Soft tissue calcifications adjacent to the medial femoral condyles. These appear chronic, possibly mild Tony-Stieda calcifications. Signed by Anny Wen DO    XR femur left 2+ views  Result Date: 7/5/2025  1. Left hip and left knee arthroplasties. 2. Displaced lesser trochanter off the proximal femur, likely chronic. Signed by Denys  MD [facility]    XR hip left with pelvis when performed 2 or 3 views  Result Date: 7/5/2025  1. Acute or subacute left superior and inferior pubic rami fractures. 2. Bilateral hip arthroplasties. 3. Lesser trochanter fragment suggesting chronic age. Signed by Denys Wall MD    XR chest 2 views  Result Date: 7/5/2025  Mild enlargement of the cardiac silhouette with mild subsegmental atelectasis in the left mid and lower lung. The remaining lungs appear free of dense consolidation. Signed by Anny Wen DO      Cardiology, Vascular, and Other Imaging  No other imaging results found for the past 2 days       Assessment/Plan   Unwitnessed mechanical fall at the nursing home resulting in left superior and inferior pubic rami fractures  Weightbearing as tolerated per Ortho  Orthopedic consultation  Pain control    Hypertensive Urgency  Plan:  Restart amlodipine 10 mg orally daily  Zebeta 5 mg orally daily  Hydralazine as needed  Lisinopril 40 mg orally daily    Gout  Plan:  Colchicine 0.6 mg daily    DVT prophylaxis  Plan:  Heparin 5000 units subcutaneously every 8 hours  SCDs    I spent 60 minutes in the professional and overall care of this patient.      Bipin Quick DO                       [1] amLODIPine, 10 mg, oral, Daily  bisoprolol, 5 mg, oral, Daily  colchicine, 0.6 mg, oral, Daily  heparin (porcine), 5,000 Units, subcutaneous, q8h  hydroxychloroquine, 200 mg, oral, Daily  latanoprost, 1 drop, Both Eyes, Nightly  [START ON 7/6/2025] pantoprazole, 40 mg, oral, Daily before breakfast   Or  [START ON 7/6/2025] pantoprazole, 40 mg, intravenous, Daily before breakfast    [2]    [3] PRN medications: acetaminophen **OR** acetaminophen **OR** acetaminophen, ondansetron **OR** ondansetron, oxyCODONE

## 2025-07-06 NOTE — CARE PLAN
The patient's goals for the shift include      The clinical goals for the shift include maintain safety this shift      Problem: Pain - Adult  Goal: Verbalizes/displays adequate comfort level or baseline comfort level  Outcome: Progressing     Problem: Safety - Adult  Goal: Free from fall injury  Outcome: Progressing     Problem: Discharge Planning  Goal: Discharge to home or other facility with appropriate resources  Outcome: Progressing     Problem: Chronic Conditions and Co-morbidities  Goal: Patient's chronic conditions and co-morbidity symptoms are monitored and maintained or improved  Outcome: Progressing

## 2025-07-06 NOTE — PROGRESS NOTES
Belén Chen is a 96 y.o. female on day 1 of admission presenting with Closed fracture of multiple pubic rami, left, initial encounter.      Subjective   Pt seen and examined.        Objective     Last Recorded Vitals  /57   Pulse 70   Temp 36.1 °C (97 °F)   Resp 16   Wt 46 kg (101 lb 8 oz)   SpO2 95%   Intake/Output last 3 Shifts:  No intake or output data in the 24 hours ending 07/06/25 1211    Admission Weight  Weight: 46 kg (101 lb 8 oz) (07/05/25 2148)    Daily Weight  07/05/25 : 46 kg (101 lb 8 oz)      Physical Exam  Constitutional:       Appearance: Normal appearance.      Comments: She follows simple commands   HENT:      Head: Normocephalic.      Right Ear: External ear normal.      Left Ear: External ear normal.      Nose: Nose normal.      Mouth/Throat:      Mouth: Mucous membranes are dry.      Pharynx: Oropharynx is clear.   Eyes:      Extraocular Movements: Extraocular movements intact.      Conjunctiva/sclera: Conjunctivae normal.      Pupils: Pupils are equal, round, and reactive to light.   Cardiovascular:      Rate and Rhythm: Normal rate and regular rhythm.   Pulmonary:      Effort: Pulmonary effort is normal.      Breath sounds: Normal breath sounds.   Abdominal:      General: Abdomen is flat. Bowel sounds are normal.      Palpations: Abdomen is soft.   Musculoskeletal:         General: Normal range of motion.      Comments: Hips are nontender   Skin:     General: Skin is warm and dry.   Neurological:      General: No focal deficit present.      Mental Status: She is alert. Mental status is at baseline.   Psychiatric:         Mood and Affect: Mood normal.         Behavior: Behavior normal.   Relevant Results  Results for orders placed or performed during the hospital encounter of 07/05/25 (from the past 24 hours)   CBC and Auto Differential   Result Value Ref Range    WBC 9.0 4.4 - 11.3 x10*3/uL    nRBC 0.0 0.0 - 0.0 /100 WBCs    RBC 3.42 (L) 4.00 - 5.20 x10*6/uL    Hemoglobin  10.7 (L) 12.0 - 16.0 g/dL    Hematocrit 33.5 (L) 36.0 - 46.0 %    MCV 98 80 - 100 fL    MCH 31.3 26.0 - 34.0 pg    MCHC 31.9 (L) 32.0 - 36.0 g/dL    RDW 12.4 11.5 - 14.5 %    Platelets 210 150 - 450 x10*3/uL    Neutrophils % 82.7 40.0 - 80.0 %    Immature Granulocytes %, Automated 1.3 (H) 0.0 - 0.9 %    Lymphocytes % 9.9 13.0 - 44.0 %    Monocytes % 4.6 2.0 - 10.0 %    Eosinophils % 0.9 0.0 - 6.0 %    Basophils % 0.6 0.0 - 2.0 %    Neutrophils Absolute 7.42 (H) 1.60 - 5.50 x10*3/uL    Immature Granulocytes Absolute, Automated 0.12 0.00 - 0.50 x10*3/uL    Lymphocytes Absolute 0.89 0.80 - 3.00 x10*3/uL    Monocytes Absolute 0.41 0.05 - 0.80 x10*3/uL    Eosinophils Absolute 0.08 0.00 - 0.40 x10*3/uL    Basophils Absolute 0.05 0.00 - 0.10 x10*3/uL   Magnesium   Result Value Ref Range    Magnesium 2.06 1.60 - 2.40 mg/dL   Comprehensive metabolic panel   Result Value Ref Range    Glucose 84 74 - 99 mg/dL    Sodium 138 136 - 145 mmol/L    Potassium 3.8 3.5 - 5.3 mmol/L    Chloride 106 98 - 107 mmol/L    Bicarbonate 22 21 - 32 mmol/L    Anion Gap 14 10 - 20 mmol/L    Urea Nitrogen 26 (H) 6 - 23 mg/dL    Creatinine 0.95 0.50 - 1.05 mg/dL    eGFR 55 (L) >60 mL/min/1.73m*2    Calcium 8.2 (L) 8.6 - 10.3 mg/dL    Albumin 3.8 3.4 - 5.0 g/dL    Alkaline Phosphatase 46 33 - 136 U/L    Total Protein 6.7 6.4 - 8.2 g/dL    AST 30 9 - 39 U/L    Bilirubin, Total 0.5 0.0 - 1.2 mg/dL    ALT 24 7 - 45 U/L   Creatine Kinase   Result Value Ref Range    Creatine Kinase 214 0 - 215 U/L   Troponin I, High Sensitivity, Initial   Result Value Ref Range    Troponin I, High Sensitivity 16 (H) 0 - 13 ng/L   B-type natriuretic peptide   Result Value Ref Range    BNP 87 0 - 99 pg/mL   Urinalysis with Reflex Culture and Microscopic   Result Value Ref Range    Color, Urine Colorless (N) Light-Yellow, Yellow, Dark-Yellow    Appearance, Urine Clear Clear    Specific Gravity, Urine 1.008 1.005 - 1.035    pH, Urine 6.5 5.0, 5.5, 6.0, 6.5, 7.0, 7.5, 8.0     Protein, Urine 20 (TRACE) NEGATIVE, 10 (TRACE), 20 (TRACE) mg/dL    Glucose, Urine Normal Normal mg/dL    Blood, Urine NEGATIVE NEGATIVE mg/dL    Ketones, Urine NEGATIVE NEGATIVE mg/dL    Bilirubin, Urine NEGATIVE NEGATIVE mg/dL    Urobilinogen, Urine Normal Normal mg/dL    Nitrite, Urine NEGATIVE NEGATIVE    Leukocyte Esterase, Urine NEGATIVE NEGATIVE   Troponin, High Sensitivity, 1 Hour   Result Value Ref Range    Troponin I, High Sensitivity 16 (H) 0 - 13 ng/L   Urinalysis Microscopic   Result Value Ref Range    WBC, Urine 1-5 1-5, NONE /HPF    RBC, Urine NONE NONE, 1-2, 3-5 /HPF   CBC   Result Value Ref Range    WBC 13.1 (H) 4.4 - 11.3 x10*3/uL    nRBC 0.0 0.0 - 0.0 /100 WBCs    RBC 3.39 (L) 4.00 - 5.20 x10*6/uL    Hemoglobin 10.5 (L) 12.0 - 16.0 g/dL    Hematocrit 32.6 (L) 36.0 - 46.0 %    MCV 96 80 - 100 fL    MCH 31.0 26.0 - 34.0 pg    MCHC 32.2 32.0 - 36.0 g/dL    RDW 12.7 11.5 - 14.5 %    Platelets 202 150 - 450 x10*3/uL   Basic metabolic panel   Result Value Ref Range    Glucose 102 (H) 74 - 99 mg/dL    Sodium 138 136 - 145 mmol/L    Potassium 4.6 3.5 - 5.3 mmol/L    Chloride 107 98 - 107 mmol/L    Bicarbonate 22 21 - 32 mmol/L    Anion Gap 14 10 - 20 mmol/L    Urea Nitrogen 27 (H) 6 - 23 mg/dL    Creatinine 1.20 (H) 0.50 - 1.05 mg/dL    eGFR 42 (L) >60 mL/min/1.73m*2    Calcium 8.4 (L) 8.6 - 10.3 mg/dL        CT head wo IV contrast   Final Result   No acute intracranial hemorrhage or mass-effect.        MACRO:   None.        Signed by: Elva Miller 7/5/2025 4:46 PM   Dictation workstation:   [id number]      CT cervical spine wo IV contrast   Final Result   No acute cervical vertebral displacement or acute displaced fracture.   Multilevel productive/degenerative changes with multilevel   spondylolisthesis, as detailed above, similar to 09/18/2023.        MACRO:   None.        Signed by: Elva Miller 7/5/2025 4:50 PM   Dictation workstation:   [id number]      CT chest abdomen pelvis w IV contrast    Final Result   Acute, traumatic, nondisplaced fractures of the left superior and   inferior pubic bones.   No additional acute thoracic, abdominal, or pelvic trauma identified.   Multiple bilateral pulmonary nodules measuring up to 5 mm.  Follow-up   per Fleischner Society guidelines.   Bilateral thyroid nodules and cysts measuring up to 1.3 cm.    Nonemergent thyroid ultrasound is recommended for further   characterization.   Moderate-sized sliding-type hiatal hernia.   Mild pulmonary edema.   Atherosclerosis with suspected stenoses of the origins of the SMA and   bilateral renal arteries with mild bilateral renal cortical thinning.   Sigmoid diverticulosis.   Additional chronic changes as detailed in the body of the report.   Signed by Dada Montelongo      XR chest 2 views   Final Result   Mild enlargement of the cardiac silhouette with mild subsegmental   atelectasis in the left mid and lower lung. The remaining lungs appear   free of dense consolidation.   Signed by Anny Wen,       XR knee 4+ views bilateral   Final Result   1.No acute traumatic bony abnormalities on x-ray examination of   the bilateral knees.   2.Bilateral knee prostheses in place.   3.Soft tissue calcifications adjacent to the medial femoral   condyles. These appear chronic, possibly mild Tony-Stieda   calcifications.   Signed by Anny Wen,       XR femur left 2+ views   Final Result   1. Left hip and left knee arthroplasties.   2. Displaced lesser trochanter off the proximal femur, likely chronic.   Signed by Denys Wall MD      XR hip left with pelvis when performed 2 or 3 views   Final Result   1. Acute or subacute left superior and inferior pubic rami fractures.   2. Bilateral hip arthroplasties.   3. Lesser trochanter fragment suggesting chronic age.   Signed by Denys Wall MD      XR ankle bilateral complete minimum 3 views   Final Result   Acute avulsion fracture of the inferior portion of the medial   malleolus of the  left ankle. Bilateral plantar fasciitis.   Signed by James Esteves MD          Scheduled medications  Scheduled Medications[1]  Continuous medications  Continuous Medications[2]  PRN medications  PRN Medications[3]         Assessment/Plan                  Assessment & Plan  Closed fracture of multiple pubic rami, left, initial encounter      Unwitnessed mechanical fall at the nursing home resulting in left superior and inferior pubic rami fractures  Weightbearing as tolerated per Ortho  Orthopedic consultation  Pain control     Hypertensive Urgency  Plan:  Restart amlodipine 10 mg orally daily  Zebeta 5 mg orally daily  Hydralazine as needed  Lisinopril 40 mg orally daily     Gout  Plan:  Colchicine 0.6 mg daily     DVT prophylaxis  Plan:  Heparin 5000 units subcutaneously every 8 hours  DIANNEs        Shabbir Perla MD           [1] amLODIPine, 10 mg, oral, Daily  bisoprolol, 5 mg, oral, Daily  colchicine, 0.6 mg, oral, Daily  heparin (porcine), 5,000 Units, subcutaneous, q12h DAMON  hydroxychloroquine, 200 mg, oral, Daily  latanoprost, 1 drop, Both Eyes, Nightly  lisinopril, 40 mg, oral, Daily  pantoprazole, 40 mg, oral, Daily before breakfast   Or  pantoprazole, 40 mg, intravenous, Daily before breakfast    [2]    [3] PRN medications: acetaminophen **OR** acetaminophen **OR** acetaminophen, hydrALAZINE, ondansetron **OR** ondansetron, oxyCODONE

## 2025-07-06 NOTE — PROGRESS NOTES
Physical Therapy    Physical Therapy Evaluation    Patient Name: Belén Chen  MRN: [id number]  Today's Date: 7/6/2025   Time Calculation  Start Time: 1321  Stop Time: 1341  Time Calculation (min): 20 min  711/711-A    Assessment/Plan   PT Assessment  PT Assessment Results: Decreased strength, Decreased endurance, Impaired balance, Decreased mobility, Decreased cognition, Decreased safety awareness  Rehab Prognosis: Fair  Barriers to Discharge Home: Cognition needs, Physical needs, Caregiver assistance  Caregiver Assistance: Caregiver assistance needed per identified barriers - however, level of patient's required assistance exceeds assistance available at home  Cognition Needs: 24hr supervision for safety awareness needed, Recollection or understanding of home exercise program limited, Insight of patient limited regarding functional ability/needs, Cognition-related high falls risk  Physical Needs: 24hr mobility assistance needed, 24hr ADL assistance needed, High falls risk due to function or environment  Evaluation/Treatment Tolerance: Patient tolerated treatment well  Medical Staff Made Aware: Yes  Strengths: Housing layout, Premorbid level of function, Support of Caregivers  Barriers to Participation: Comorbidities, Ability to acquire knowledge  End of Session Communication: Bedside nurse  Assessment Comment: Pt presents with weakness, decreased ambulation and transfers, and severe unsteadiness due to L pubic rami fx; can benefit from skilled PT intervention to assist with discharge planning and address the aforementioned issues to enable the pt to return to their prior level of function, which was independent with ww.  End of Session Patient Position: Bed, 3 rail up, Alarm on  IP OR SWING BED PT PLAN  Inpatient or Swing Bed: Inpatient  PT Plan  Treatment/Interventions: Bed mobility, Transfer training, Gait training, Balance training, Neuromuscular re-education, Strengthening, Endurance training, Therapeutic  exercise, Therapeutic activity, Home exercise program, Postural re-education  PT Plan: Ongoing PT  PT Frequency: 3 times per week (For this acute inpatient hospitalization.)  PT Discharge Recommendations: Moderate intensity level of continued care (Based on current functional status and rehab potential, patient is anticipated to tolerate and benefit from 5 or more days per week of skilled rehabilitative therapy after discharge from this acute inpatient hospitalization.)  Equipment Recommended upon Discharge:  (has ww)  PT Recommended Transfer Status: Assist x2, Assistive device  PT - OK to Discharge: Yes (Per PT POC)    Subjective     Current Problem:  1. Closed fracture of multiple pubic rami, left, initial encounter        2. Elevated troponin        3. Essential hypertension          Problem List[1]    General Visit Information:  General  Reason for Referral: Pt admitted from [facility] with an unwitnessed fall. + L incomplete pelvic ring fx.  Family/Caregiver Present: No  Caregiver Feedback: Attempted to call facility, but no one available. Called son, who was able to provide more histoy.  Prior to Session Communication: Bedside nurse  Patient Position Received: Bed, 3 rail up, Alarm on  Preferred Learning Style: auditory  General Comment: Pt is pleasantly confused, does not answer questions appropriately, does follow enough commands to participate.    Home Living:  Home Living  Type of Home: Assisted living  Lives With: Alone (son reports that he has a sitter with her in the morning to help get her going)  Home Adaptive Equipment: Walker rolling or standard  Home Layout: One level  Home Access: No concerns    Prior Level of Function:  Prior Function Per Pt/Caregiver Report  Level of [place]:  (Son reports that pt dresses herself, though may wear the same sweater many days in a row. Likely needs supervision for bathing. She is able to amb mod indep with a ww around the facility.)  Receives Help  From:  (staff/sitter)    Precautions:  Precautions  LE Weight Bearing Status: Weight Bearing as Tolerated  Medical Precautions: Fall precautions, Oxygen therapy device and L/min  Precautions Comment: Pt kept taking O2 NC off.     Objective     Pain:  Pain Assessment  Pain Assessment: Fuentes-Baker FACES  0-10 (Numeric) Pain Score: 0 - No pain    Cognition:  Cognition  Overall Cognitive Status: Impaired at baseline  Orientation Level: Disoriented to place, Disoriented to situation, Disoriented to time  Following Commands:  (follows < 50% simple commands with repetition and multimodal cueing)  Attention:  (easily distracted, but redirectable)  Memory:  (impaired)  Insight: Severe  Impulsive: Within functional limits    General Assessments:      Activity Tolerance  Endurance: Tolerates less than 10 min exercise, no significant change in vital signs        Perception  Inattention/Neglect: Appears intact  Initiation: Cues to initiate tasks        Static Sitting Balance  Static Sitting-Balance Support: Feet supported, No upper extremity supported  Static Sitting-Level of Assistance: Distant supervision  Dynamic Sitting Balance  Dynamic Sitting-Balance Support: Feet unsupported, Bilateral upper extremity supported  Dynamic Sitting-Level of Assistance: Close supervision  Dynamic Sitting-Balance: Trunk control activities (scooting to EOB)  Static Standing Balance  Static Standing-Balance Support: Bilateral upper extremity supported  Static Standing-Level of Assistance: Maximum assistance  Static Standing-Comment/Number of Minutes: 1 (Decreased weight on LLE, exhibits pain-avoidance to LLE  in standing. Cued to WS COG forward and to the L, but pt unable to tolerate.)    Functional Assessments:     Bed Mobility  Bed Mobility: Yes  Bed Mobility 1  Bed Mobility 1: Supine to sitting, Sitting to supine, Log roll  Level of Assistance 1: Maximum assistance, Maximum verbal cues, Maximum tactile cues  Bed Mobility Comments 1: Pt educated  in bed mobility technique moving supine->sit via sidelying; pt requires max VC's for technique and proper UE placements for upper body initiate rolling and to use L UE to reach over for opposite bedrail, and to use BUE to push up into sitting from sidelying. Pt able to initiate rolling, but needs assist with BLE and trunk.  Bed Mobility 2  Bed Mobility  2: Scooting (towards EOB in sitting)  Level of Assistance 2: Contact guard, Minimal verbal cues  Transfers  Transfer: Yes  Transfer 1  Technique 1: Sit to stand, Stand to sit  Transfer Device 1: Walker, Gait belt  Transfer Level of Assistance 1: Maximum assistance, Maximum verbal cues, Maximum tactile cues  Trials/Comments 1: Pt provided instruction in safe sit<->stand technique to enable them to move in/out of bed safely; pt required max tactile and verbal cues for proper hand placements and to scoot to edge of sitting surface to facilitate ease of sit->stand, and to line up to and reach back for sitting surface before sitting. Pt having difficulty following commands to push up with UE's from EOB, so was permitted to pull up on RW with therapist bracing walker.  Ambulation/Gait Training  Ambulation/Gait Training Performed:  (Unable to due to poor standing balance and tolerance.)     Extremity/Trunk Assessments:  RUE   RUE :  (at least 3/5, unable to MMT due to decreased cognition)  LUE   LUE:  (at least 3/5, unable to MMT due to decreased cognition)  RLE   RLE :  (< 3/5, difficulty following commands)  LLE   LLE :  (< 3/5, difficulty following commands; son reports that pt has a L AFO as needed due to a previous dx of foot drop. He states that the PT at the facility reported that her foot drop improved, and she amb better without the AFO.)    Outcome Measures:     [facility] Basic Mobility  Turning from your back to your side while in a flat bed without using bedrails: A lot  Moving from lying on your back to sitting on the side of a flat bed without using bedrails: A  lot  Moving to and from bed to chair (including a wheelchair): Total  Standing up from a chair using your arms (e.g. wheelchair or bedside chair): A lot  To walk in hospital room: Total  Climbing 3-5 steps with railing: Total  Basic Mobility - Total Score: 9        Goals:  Encounter Problems       Encounter Problems (Active)       Balance       STG - Maintains dynamic standing balance with ww with mod A x 5 minutes       Start:  07/06/25    Expected End:  07/20/25       INTERVENTIONS:1. Practice standing with minimal support.2. Educate patient about standing tolerance.3. Educate patient about independence with gait, transfers, and ADL's.4. Educate patient about use of assistive device.5. Educate patient about self-directed care.            Mobility       STG - Patient will ambulate with ww 10' with mod A       Start:  07/06/25    Expected End:  07/20/25            Increase BLE strength to attain functional goals achieved through supine and seated TE.        Start:  07/06/25    Expected End:  07/20/25               PT Transfers       STG - Transfer from bed to chair with ww with mod A       Start:  07/06/25    Expected End:  07/20/25            STG - Patient to transfer to and from sit to supine with min A       Start:  07/06/25    Expected End:  07/20/25            STG - Patient will transfer sit to and from stand with ww with min A       Start:  07/06/25    Expected End:  07/20/25                 Education Documentation  Precautions, taught by Dyan Boykin, PT at 7/6/2025  2:10 PM.  Learner: Patient  Readiness: Acceptance  Method: Explanation  Response: Needs Reinforcement, Demonstrated Understanding  Comment: see above    Body Mechanics, taught by Dyan Boykin, PT at 7/6/2025  2:10 PM.  Learner: Patient  Readiness: Acceptance  Method: Explanation  Response: Needs Reinforcement, Demonstrated Understanding  Comment: see above    Mobility Training, taught by Dyan Boykin, PT at 7/6/2025  2:10 PM.  Learner:  Patient  Readiness: Acceptance  Method: Explanation  Response: Needs Reinforcement, Demonstrated Understanding  Comment: see above    Education Comments  No comments found.              [1]   Patient Active Problem List  Diagnosis    Closed fracture of multiple pubic rami, left, initial encounter    Pubic ramus fracture, left, closed, initial encounter (Multi)

## 2025-07-07 LAB
ANION GAP SERPL CALC-SCNC: 15 MMOL/L (ref 10–20)
ATRIAL RATE: 70 BPM
ATRIAL RATE: 98 BPM
BUN SERPL-MCNC: 48 MG/DL (ref 6–23)
CALCIUM SERPL-MCNC: 8.3 MG/DL (ref 8.6–10.3)
CHLORIDE SERPL-SCNC: 105 MMOL/L (ref 98–107)
CO2 SERPL-SCNC: 21 MMOL/L (ref 21–32)
CREAT SERPL-MCNC: 2.1 MG/DL (ref 0.5–1.05)
EGFRCR SERPLBLD CKD-EPI 2021: 21 ML/MIN/1.73M*2
ERYTHROCYTE [DISTWIDTH] IN BLOOD BY AUTOMATED COUNT: 13.1 % (ref 11.5–14.5)
GLUCOSE SERPL-MCNC: 89 MG/DL (ref 74–99)
HCT VFR BLD AUTO: 28.9 % (ref 36–46)
HGB BLD-MCNC: 9.8 G/DL (ref 12–16)
MCH RBC QN AUTO: 31.5 PG (ref 26–34)
MCHC RBC AUTO-ENTMCNC: 33.9 G/DL (ref 32–36)
MCV RBC AUTO: 93 FL (ref 80–100)
NRBC BLD-RTO: 0 /100 WBCS (ref 0–0)
P AXIS: 14 DEGREES
P OFFSET: 182 MS
P OFFSET: 192 MS
P ONSET: 125 MS
P ONSET: 128 MS
PLATELET # BLD AUTO: 157 X10*3/UL (ref 150–450)
POTASSIUM SERPL-SCNC: 4.6 MMOL/L (ref 3.5–5.3)
PR INTERVAL: 170 MS
PR INTERVAL: 178 MS
Q ONSET: 213 MS
Q ONSET: 214 MS
QRS COUNT: 11 BEATS
QRS COUNT: 16 BEATS
QRS DURATION: 132 MS
QRS DURATION: 132 MS
QT INTERVAL: 380 MS
QT INTERVAL: 416 MS
QTC CALCULATION(BAZETT): 449 MS
QTC CALCULATION(BAZETT): 485 MS
QTC FREDERICIA: 438 MS
QTC FREDERICIA: 447 MS
R AXIS: -15 DEGREES
R AXIS: -15 DEGREES
RBC # BLD AUTO: 3.11 X10*6/UL (ref 4–5.2)
SODIUM SERPL-SCNC: 136 MMOL/L (ref 136–145)
T AXIS: -16 DEGREES
T AXIS: -9 DEGREES
T OFFSET: 403 MS
T OFFSET: 422 MS
VENTRICULAR RATE: 70 BPM
VENTRICULAR RATE: 98 BPM
WBC # BLD AUTO: 13.3 X10*3/UL (ref 4.4–11.3)

## 2025-07-07 PROCEDURE — 99232 SBSQ HOSP IP/OBS MODERATE 35: CPT | Performed by: INTERNAL MEDICINE

## 2025-07-07 PROCEDURE — 85027 COMPLETE CBC AUTOMATED: CPT | Performed by: INTERNAL MEDICINE

## 2025-07-07 PROCEDURE — 80048 BASIC METABOLIC PNL TOTAL CA: CPT | Performed by: INTERNAL MEDICINE

## 2025-07-07 PROCEDURE — 2500000001 HC RX 250 WO HCPCS SELF ADMINISTERED DRUGS (ALT 637 FOR MEDICARE OP): Performed by: INTERNAL MEDICINE

## 2025-07-07 PROCEDURE — 1200000002 HC GENERAL ROOM WITH TELEMETRY DAILY

## 2025-07-07 PROCEDURE — 2500000004 HC RX 250 GENERAL PHARMACY W/ HCPCS (ALT 636 FOR OP/ED): Performed by: INTERNAL MEDICINE

## 2025-07-07 PROCEDURE — 36415 COLL VENOUS BLD VENIPUNCTURE: CPT | Performed by: INTERNAL MEDICINE

## 2025-07-07 PROCEDURE — 96372 THER/PROPH/DIAG INJ SC/IM: CPT | Performed by: INTERNAL MEDICINE

## 2025-07-07 RX ORDER — SODIUM CHLORIDE 9 MG/ML
75 INJECTION, SOLUTION INTRAVENOUS CONTINUOUS
Status: ACTIVE | OUTPATIENT
Start: 2025-07-07 | End: 2025-07-08

## 2025-07-07 RX ADMIN — AMLODIPINE BESYLATE 10 MG: 10 TABLET ORAL at 09:49

## 2025-07-07 RX ADMIN — LATANOPROST 1 DROP: 50 SOLUTION/ DROPS OPHTHALMIC at 20:50

## 2025-07-07 RX ADMIN — BISOPROLOL FUMARATE 5 MG: 5 TABLET, FILM COATED ORAL at 09:49

## 2025-07-07 RX ADMIN — PANTOPRAZOLE SODIUM 40 MG: 40 TABLET, DELAYED RELEASE ORAL at 06:10

## 2025-07-07 RX ADMIN — COLCHICINE 0.6 MG: 0.6 TABLET, FILM COATED ORAL at 09:49

## 2025-07-07 RX ADMIN — HYDROXYCHLOROQUINE SULFATE 200 MG: 200 TABLET, FILM COATED ORAL at 09:49

## 2025-07-07 RX ADMIN — LISINOPRIL 40 MG: 20 TABLET ORAL at 09:49

## 2025-07-07 RX ADMIN — SODIUM CHLORIDE 75 ML/HR: 0.9 INJECTION, SOLUTION INTRAVENOUS at 14:43

## 2025-07-07 RX ADMIN — HEPARIN SODIUM 5000 UNITS: 5000 INJECTION, SOLUTION INTRAVENOUS; SUBCUTANEOUS at 20:49

## 2025-07-07 RX ADMIN — HEPARIN SODIUM 5000 UNITS: 5000 INJECTION, SOLUTION INTRAVENOUS; SUBCUTANEOUS at 09:49

## 2025-07-07 RX ADMIN — HYDRALAZINE HYDROCHLORIDE 10 MG: 20 INJECTION INTRAMUSCULAR; INTRAVENOUS at 20:49

## 2025-07-07 SDOH — ECONOMIC STABILITY: FOOD INSECURITY: WITHIN THE PAST 12 MONTHS, THE FOOD YOU BOUGHT JUST DIDN'T LAST AND YOU DIDN'T HAVE MONEY TO GET MORE.: NEVER TRUE

## 2025-07-07 SDOH — ECONOMIC STABILITY: HOUSING INSECURITY: AT ANY TIME IN THE PAST 12 MONTHS, WERE YOU HOMELESS OR LIVING IN A SHELTER (INCLUDING NOW)?: NO

## 2025-07-07 SDOH — ECONOMIC STABILITY: HOUSING INSECURITY: IN THE PAST 12 MONTHS, HOW MANY TIMES HAVE YOU MOVED WHERE YOU WERE LIVING?: 0

## 2025-07-07 SDOH — HEALTH STABILITY: MENTAL HEALTH: HOW OFTEN DO YOU HAVE A DRINK CONTAINING ALCOHOL?: NEVER

## 2025-07-07 SDOH — HEALTH STABILITY: MENTAL HEALTH: HOW MANY DRINKS CONTAINING ALCOHOL DO YOU HAVE ON A TYPICAL DAY WHEN YOU ARE DRINKING?: PATIENT DOES NOT DRINK

## 2025-07-07 SDOH — HEALTH STABILITY: MENTAL HEALTH: HOW OFTEN DO YOU HAVE SIX OR MORE DRINKS ON ONE OCCASION?: NEVER

## 2025-07-07 SDOH — ECONOMIC STABILITY: FOOD INSECURITY: HOW HARD IS IT FOR YOU TO PAY FOR THE VERY BASICS LIKE FOOD, HOUSING, MEDICAL CARE, AND HEATING?: NOT HARD AT ALL

## 2025-07-07 SDOH — ECONOMIC STABILITY: INCOME INSECURITY: IN THE PAST 12 MONTHS HAS THE ELECTRIC, GAS, OIL, OR WATER COMPANY THREATENED TO SHUT OFF SERVICES IN YOUR HOME?: NO

## 2025-07-07 SDOH — SOCIAL STABILITY: SOCIAL INSECURITY: ARE YOU MARRIED, WIDOWED, DIVORCED, SEPARATED, NEVER MARRIED, OR LIVING WITH A PARTNER?: WIDOWED

## 2025-07-07 SDOH — ECONOMIC STABILITY: HOUSING INSECURITY: IN THE LAST 12 MONTHS, WAS THERE A TIME WHEN YOU WERE NOT ABLE TO PAY THE MORTGAGE OR RENT ON TIME?: NO

## 2025-07-07 SDOH — ECONOMIC STABILITY: FOOD INSECURITY: WITHIN THE PAST 12 MONTHS, YOU WORRIED THAT YOUR FOOD WOULD RUN OUT BEFORE YOU GOT THE MONEY TO BUY MORE.: NEVER TRUE

## 2025-07-07 SDOH — ECONOMIC STABILITY: TRANSPORTATION INSECURITY: IN THE PAST 12 MONTHS, HAS LACK OF TRANSPORTATION KEPT YOU FROM MEDICAL APPOINTMENTS OR FROM GETTING MEDICATIONS?: NO

## 2025-07-07 ASSESSMENT — COGNITIVE AND FUNCTIONAL STATUS - GENERAL
STANDING UP FROM CHAIR USING ARMS: A LOT
MOBILITY SCORE: 11
CLIMB 3 TO 5 STEPS WITH RAILING: TOTAL
TURNING FROM BACK TO SIDE WHILE IN FLAT BAD: A LOT
DRESSING REGULAR UPPER BODY CLOTHING: A LOT
EATING MEALS: A LITTLE
MOVING FROM LYING ON BACK TO SITTING ON SIDE OF FLAT BED WITH BEDRAILS: A LOT
DAILY ACTIVITIY SCORE: 13
TOILETING: A LOT
PERSONAL GROOMING: A LOT
WALKING IN HOSPITAL ROOM: A LOT
MOVING TO AND FROM BED TO CHAIR: A LOT
HELP NEEDED FOR BATHING: A LOT
DRESSING REGULAR LOWER BODY CLOTHING: A LOT

## 2025-07-07 ASSESSMENT — PAIN SCALES - GENERAL
PAINLEVEL_OUTOF10: 0 - NO PAIN

## 2025-07-07 ASSESSMENT — LIFESTYLE VARIABLES
SKIP TO QUESTIONS 9-10: 1
AUDIT-C TOTAL SCORE: 0

## 2025-07-07 ASSESSMENT — PAIN - FUNCTIONAL ASSESSMENT
PAIN_FUNCTIONAL_ASSESSMENT: WONG-BAKER FACES

## 2025-07-07 ASSESSMENT — ACTIVITIES OF DAILY LIVING (ADL)
LACK_OF_TRANSPORTATION: NO
LACK_OF_TRANSPORTATION: NO

## 2025-07-07 NOTE — CARE PLAN
The patient's goals for the shift include      The clinical goals for the shift include maintain safety this shift

## 2025-07-07 NOTE — PROGRESS NOTES
07/07/25 1142   Discharge Planning   Living Arrangements Alone   Support Systems Children;Family members   Assistance Needed ADLs   Type of Residence Nursing home/residential care   Do you have animals or pets at home? No   Who is requesting discharge planning? Provider   Home or Post Acute Services Post acute facilities (Rehab/SNF/etc)   Type of Post Acute Facility Services Long term care   Expected Discharge Disposition Inter   Does the patient need discharge transport arranged? Yes   [facility] coordination needed? Yes   Has discharge transport been arranged? No   Financial Resource Strain   How hard is it for you to pay for the very basics like food, housing, medical care, and heating? Not hard   Housing Stability   In the last 12 months, was there a time when you were not able to pay the mortgage or rent on time? N   In the past 12 months, how many times have you moved where you were living? 0   At any time in the past 12 months, were you homeless or living in a shelter (including now)? N   Transportation Needs   In the past 12 months, has lack of transportation kept you from medical appointments or from getting medications? no   In the past 12 months, has lack of transportation kept you from meetings, work, or from getting things needed for daily living? No   Patient Choice   Provider Choice list and CMS website (https://medicare.gov/care-compare#search) for post-acute Quality and Resource Measure Data were provided and reviewed with: Family   Patient / Family choosing to utilize agency / facility established prior to hospitalization Yes   Stroke Family Assessment   Stroke Family Assessment Needed No   Intensity of Service   Intensity of Service 0-30 min     7/7/25 1143  Patient from [facility].  Confirmed with facility that she is LTC and there are no barriers to her returning.  She cannot return skilled as she is observation.  Spoke with son  April and he confirmed that he wants her to return  there.  He has arranged for private duty aides to sit with her at the facility.  He would like notified of discharge time.  ADOD today.  Edel Carson RN TCC    7/7/25 1257  Per Dr Perla, patient not med ready now due to rise in creatinine.  Facility notified.  Will call and updated son.  Edel Carson RN TCC

## 2025-07-07 NOTE — PROGRESS NOTES
Occupational Therapy                 Therapy Communication Note    Patient Name: Belén Chen  MRN: [id number]  Department: [place] [id number]  Room: [facility]  Today's Date: 7/7/2025     Discipline: Occupational Therapy    Missed Visit: OT Missed Visit: Yes     Missed Visit Reason: Missed Visit Reason: Other (Comment) (SPoke with TCC, pt. LTC resident and will return once medically cleared. Pt. gonzalez snto require skilled OT services upon d/c.)    Missed Time: Attempt

## 2025-07-07 NOTE — PROGRESS NOTES
Belén Chen is a 96 y.o. female on day 1 of admission presenting with Closed fracture of multiple pubic rami, left, initial encounter.      Subjective   Pt seen and examined.        Objective     Last Recorded Vitals  /69   Pulse 64   Temp 36.5 °C (97.7 °F) (Temporal)   Resp 18   Wt 46 kg (101 lb 8 oz)   SpO2 97%   Intake/Output last 3 Shifts:    Intake/Output Summary (Last 24 hours) at 7/7/2025 1317  Last data filed at 7/7/2025 0540  Gross per 24 hour   Intake 120 ml   Output 0 ml   Net 120 ml       Admission Weight  Weight: 46 kg (101 lb 8 oz) (07/05/25 2148)    Daily Weight  07/05/25 : 46 kg (101 lb 8 oz)      Physical Exam  Constitutional:       Appearance: Normal appearance.      Comments: She follows simple commands   HENT:      Head: Normocephalic.      Right Ear: External ear normal.      Left Ear: External ear normal.      Nose: Nose normal.      Mouth/Throat:      Mouth: Mucous membranes are dry.      Pharynx: Oropharynx is clear.   Eyes:      Extraocular Movements: Extraocular movements intact.      Conjunctiva/sclera: Conjunctivae normal.      Pupils: Pupils are equal, round, and reactive to light.   Cardiovascular:      Rate and Rhythm: Normal rate and regular rhythm.   Pulmonary:      Effort: Pulmonary effort is normal.      Breath sounds: Normal breath sounds.   Abdominal:      General: Abdomen is flat. Bowel sounds are normal.      Palpations: Abdomen is soft.   Musculoskeletal:         General: Normal range of motion.      Comments: Hips are nontender   Skin:     General: Skin is warm and dry.   Neurological:      General: No focal deficit present.      Mental Status: She is alert. Mental status is at baseline.   Psychiatric:         Mood and Affect: Mood normal.         Behavior: Behavior normal.   Relevant Results  Results for orders placed or performed during the hospital encounter of 07/05/25 (from the past 24 hours)   CBC   Result Value Ref Range    WBC 13.3 (H) 4.4 - 11.3  x10*3/uL    nRBC 0.0 0.0 - 0.0 /100 WBCs    RBC 3.11 (L) 4.00 - 5.20 x10*6/uL    Hemoglobin 9.8 (L) 12.0 - 16.0 g/dL    Hematocrit 28.9 (L) 36.0 - 46.0 %    MCV 93 80 - 100 fL    MCH 31.5 26.0 - 34.0 pg    MCHC 33.9 32.0 - 36.0 g/dL    RDW 13.1 11.5 - 14.5 %    Platelets 157 150 - 450 x10*3/uL   Basic Metabolic Panel   Result Value Ref Range    Glucose 89 74 - 99 mg/dL    Sodium 136 136 - 145 mmol/L    Potassium 4.6 3.5 - 5.3 mmol/L    Chloride 105 98 - 107 mmol/L    Bicarbonate 21 21 - 32 mmol/L    Anion Gap 15 10 - 20 mmol/L    Urea Nitrogen 48 (H) 6 - 23 mg/dL    Creatinine 2.10 (H) 0.50 - 1.05 mg/dL    eGFR 21 (L) >60 mL/min/1.73m*2    Calcium 8.3 (L) 8.6 - 10.3 mg/dL        CT head wo IV contrast   Final Result   No acute intracranial hemorrhage or mass-effect.        MACRO:   None.        Signed by: Elva Miller 7/5/2025 4:46 PM   Dictation workstation:   [id number]      CT cervical spine wo IV contrast   Final Result   No acute cervical vertebral displacement or acute displaced fracture.   Multilevel productive/degenerative changes with multilevel   spondylolisthesis, as detailed above, similar to 09/18/2023.        MACRO:   None.        Signed by: Elva Miller 7/5/2025 4:50 PM   Dictation workstation:   [id number]      CT chest abdomen pelvis w IV contrast   Final Result   Acute, traumatic, nondisplaced fractures of the left superior and   inferior pubic bones.   No additional acute thoracic, abdominal, or pelvic trauma identified.   Multiple bilateral pulmonary nodules measuring up to 5 mm.  Follow-up   per Fleischner Society guidelines.   Bilateral thyroid nodules and cysts measuring up to 1.3 cm.    Nonemergent thyroid ultrasound is recommended for further   characterization.   Moderate-sized sliding-type hiatal hernia.   Mild pulmonary edema.   Atherosclerosis with suspected stenoses of the origins of the SMA and   bilateral renal arteries with mild bilateral renal cortical thinning.   Sigmoid  diverticulosis.   Additional chronic changes as detailed in the body of the report.   Signed by Dada Montelongo      XR chest 2 views   Final Result   Mild enlargement of the cardiac silhouette with mild subsegmental   atelectasis in the left mid and lower lung. The remaining lungs appear   free of dense consolidation.   Signed by Anny Wen,       XR knee 4+ views bilateral   Final Result   1.No acute traumatic bony abnormalities on x-ray examination of   the bilateral knees.   2.Bilateral knee prostheses in place.   3.Soft tissue calcifications adjacent to the medial femoral   condyles. These appear chronic, possibly mild Tony-Stieda   calcifications.   Signed by Anny Wen,       XR femur left 2+ views   Final Result   1. Left hip and left knee arthroplasties.   2. Displaced lesser trochanter off the proximal femur, likely chronic.   Signed by Denys Wall MD      XR hip left with pelvis when performed 2 or 3 views   Final Result   1. Acute or subacute left superior and inferior pubic rami fractures.   2. Bilateral hip arthroplasties.   3. Lesser trochanter fragment suggesting chronic age.   Signed by Denys Wall MD      XR ankle bilateral complete minimum 3 views   Final Result   Acute avulsion fracture of the inferior portion of the medial   malleolus of the left ankle. Bilateral plantar fasciitis.   Signed by James Esteves MD          Scheduled medications  Scheduled Medications[1]  Continuous medications  Continuous Medications[2]  PRN medications  PRN Medications[3]         Assessment/Plan                  Assessment & Plan  Closed fracture of multiple pubic rami, left, initial encounter    Pubic ramus fracture, left, closed, initial encounter (Multi)      Unwitnessed mechanical fall at the nursing home resulting in left superior and inferior pubic rami fractures  Weightbearing as tolerated per Ortho  Orthopedic consultation  Pain control  PT/OT     Hypertensive Urgency  Plan:  Restart  amlodipine 10 mg orally daily  Zebeta 5 mg orally daily  Hydralazine as needed  Lisinopril 40 mg orally daily, on hold due to RADHA    RADHA  -Cr up  -hold lisinopril  -IV fluids  -recheck in am     Gout  Plan:  Colchicine 0.6 mg daily     DVT prophylaxis  Plan:  Heparin 5000 units subcutaneously every 8 hours  SCDs        Shabbir Perla MD             [1] amLODIPine, 10 mg, oral, Daily  bisoprolol, 5 mg, oral, Daily  colchicine, 0.6 mg, oral, Daily  heparin (porcine), 5,000 Units, subcutaneous, q12h DAMON  hydroxychloroquine, 200 mg, oral, Daily  latanoprost, 1 drop, Both Eyes, Nightly  [Held by provider] lisinopril, 40 mg, oral, Daily  pantoprazole, 40 mg, oral, Daily before breakfast     [2] sodium chloride 0.9%, 75 mL/hr     [3] PRN medications: acetaminophen **OR** acetaminophen **OR** acetaminophen, hydrALAZINE, ondansetron **OR** ondansetron, oxyCODONE

## 2025-07-08 LAB
ANION GAP SERPL CALC-SCNC: 17 MMOL/L (ref 10–20)
BUN SERPL-MCNC: 59 MG/DL (ref 6–23)
CALCIUM SERPL-MCNC: 8.1 MG/DL (ref 8.6–10.3)
CHLORIDE SERPL-SCNC: 108 MMOL/L (ref 98–107)
CO2 SERPL-SCNC: 19 MMOL/L (ref 21–32)
CREAT SERPL-MCNC: 1.94 MG/DL (ref 0.5–1.05)
EGFRCR SERPLBLD CKD-EPI 2021: 23 ML/MIN/1.73M*2
ERYTHROCYTE [DISTWIDTH] IN BLOOD BY AUTOMATED COUNT: 12.7 % (ref 11.5–14.5)
GLUCOSE SERPL-MCNC: 73 MG/DL (ref 74–99)
HCT VFR BLD AUTO: 28.2 % (ref 36–46)
HGB BLD-MCNC: 9.3 G/DL (ref 12–16)
MCH RBC QN AUTO: 31.1 PG (ref 26–34)
MCHC RBC AUTO-ENTMCNC: 33 G/DL (ref 32–36)
MCV RBC AUTO: 94 FL (ref 80–100)
NRBC BLD-RTO: 0 /100 WBCS (ref 0–0)
PLATELET # BLD AUTO: 157 X10*3/UL (ref 150–450)
POTASSIUM SERPL-SCNC: 4.6 MMOL/L (ref 3.5–5.3)
RBC # BLD AUTO: 2.99 X10*6/UL (ref 4–5.2)
SODIUM SERPL-SCNC: 139 MMOL/L (ref 136–145)
WBC # BLD AUTO: 12 X10*3/UL (ref 4.4–11.3)

## 2025-07-08 PROCEDURE — 36415 COLL VENOUS BLD VENIPUNCTURE: CPT | Performed by: INTERNAL MEDICINE

## 2025-07-08 PROCEDURE — 1100000001 HC PRIVATE ROOM DAILY

## 2025-07-08 PROCEDURE — 85027 COMPLETE CBC AUTOMATED: CPT | Performed by: INTERNAL MEDICINE

## 2025-07-08 PROCEDURE — 2500000004 HC RX 250 GENERAL PHARMACY W/ HCPCS (ALT 636 FOR OP/ED): Performed by: INTERNAL MEDICINE

## 2025-07-08 PROCEDURE — 84520 ASSAY OF UREA NITROGEN: CPT | Performed by: INTERNAL MEDICINE

## 2025-07-08 PROCEDURE — 99232 SBSQ HOSP IP/OBS MODERATE 35: CPT | Performed by: INTERNAL MEDICINE

## 2025-07-08 PROCEDURE — 2500000002 HC RX 250 W HCPCS SELF ADMINISTERED DRUGS (ALT 637 FOR MEDICARE OP, ALT 636 FOR OP/ED): Performed by: INTERNAL MEDICINE

## 2025-07-08 PROCEDURE — 97530 THERAPEUTIC ACTIVITIES: CPT | Mod: GP,CQ

## 2025-07-08 PROCEDURE — 2500000001 HC RX 250 WO HCPCS SELF ADMINISTERED DRUGS (ALT 637 FOR MEDICARE OP): Performed by: INTERNAL MEDICINE

## 2025-07-08 RX ORDER — RISPERIDONE 1 MG/1
1 TABLET ORAL NIGHTLY
COMMUNITY

## 2025-07-08 RX ORDER — RISPERIDONE 1 MG/1
1 TABLET ORAL NIGHTLY
Status: DISCONTINUED | OUTPATIENT
Start: 2025-07-08 | End: 2025-07-09 | Stop reason: HOSPADM

## 2025-07-08 RX ADMIN — RISPERIDONE 1 MG: 1 TABLET, FILM COATED ORAL at 20:34

## 2025-07-08 RX ADMIN — BISOPROLOL FUMARATE 5 MG: 5 TABLET, FILM COATED ORAL at 09:38

## 2025-07-08 RX ADMIN — OXYCODONE HYDROCHLORIDE 5 MG: 5 TABLET ORAL at 10:24

## 2025-07-08 RX ADMIN — PANTOPRAZOLE SODIUM 40 MG: 40 TABLET, DELAYED RELEASE ORAL at 05:54

## 2025-07-08 RX ADMIN — HEPARIN SODIUM 5000 UNITS: 5000 INJECTION, SOLUTION INTRAVENOUS; SUBCUTANEOUS at 09:38

## 2025-07-08 RX ADMIN — HYDROXYCHLOROQUINE SULFATE 200 MG: 200 TABLET, FILM COATED ORAL at 09:38

## 2025-07-08 RX ADMIN — AMLODIPINE BESYLATE 10 MG: 10 TABLET ORAL at 09:38

## 2025-07-08 RX ADMIN — LATANOPROST 1 DROP: 50 SOLUTION/ DROPS OPHTHALMIC at 20:34

## 2025-07-08 RX ADMIN — HEPARIN SODIUM 5000 UNITS: 5000 INJECTION, SOLUTION INTRAVENOUS; SUBCUTANEOUS at 20:34

## 2025-07-08 RX ADMIN — COLCHICINE 0.6 MG: 0.6 TABLET, FILM COATED ORAL at 09:38

## 2025-07-08 ASSESSMENT — PAIN SCALES - GENERAL
PAINLEVEL_OUTOF10: 0 - NO PAIN
PAINLEVEL_OUTOF10: 0 - NO PAIN
PAINLEVEL_OUTOF10: 8
PAINLEVEL_OUTOF10: 0 - NO PAIN
PAINLEVEL_OUTOF10: 0 - NO PAIN

## 2025-07-08 ASSESSMENT — COGNITIVE AND FUNCTIONAL STATUS - GENERAL
MOVING FROM LYING ON BACK TO SITTING ON SIDE OF FLAT BED WITH BEDRAILS: A LOT
MOVING TO AND FROM BED TO CHAIR: A LOT
HELP NEEDED FOR BATHING: A LOT
TURNING FROM BACK TO SIDE WHILE IN FLAT BAD: A LOT
CLIMB 3 TO 5 STEPS WITH RAILING: TOTAL
WALKING IN HOSPITAL ROOM: A LOT
WALKING IN HOSPITAL ROOM: TOTAL
EATING MEALS: A LITTLE
MOBILITY SCORE: 9
TURNING FROM BACK TO SIDE WHILE IN FLAT BAD: A LOT
PERSONAL GROOMING: A LITTLE
MOVING TO AND FROM BED TO CHAIR: TOTAL
STANDING UP FROM CHAIR USING ARMS: A LOT
MOVING FROM LYING ON BACK TO SITTING ON SIDE OF FLAT BED WITH BEDRAILS: A LOT
TOILETING: A LOT
DAILY ACTIVITIY SCORE: 14
CLIMB 3 TO 5 STEPS WITH RAILING: TOTAL
DRESSING REGULAR LOWER BODY CLOTHING: A LOT
DRESSING REGULAR UPPER BODY CLOTHING: A LOT
STANDING UP FROM CHAIR USING ARMS: A LOT
MOBILITY SCORE: 11

## 2025-07-08 ASSESSMENT — PAIN - FUNCTIONAL ASSESSMENT
PAIN_FUNCTIONAL_ASSESSMENT: 0-10
PAIN_FUNCTIONAL_ASSESSMENT: WONG-BAKER FACES
PAIN_FUNCTIONAL_ASSESSMENT: 0-10
PAIN_FUNCTIONAL_ASSESSMENT: WONG-BAKER FACES

## 2025-07-08 ASSESSMENT — PAIN DESCRIPTION - LOCATION: LOCATION: HIP

## 2025-07-08 ASSESSMENT — PAIN DESCRIPTION - ORIENTATION: ORIENTATION: LEFT

## 2025-07-08 NOTE — PROGRESS NOTES
Pharmacy Medication History     Source of Information: med list     Additional concerns with the patient's PTA list.     Notified Provider via [facility]iku : yes     The following updates were made to the Prior to Admission medication list:     Allergy reviewed : No    Meds 2 Beds : N/A    Outpatient pharmacy confirmed and updated in chart : N/A    Pharmacy name:     The list below reflectives the updated PTA list. Please review each medication in order reconciliation for additional clarification and justification.    Prior to Admission Medications   Prescriptions Last Dose Informant   alendronate (Fosamax) 70 mg tablet     Sig: Take 1 tablet (70 mg) by mouth every 7 days.   amLODIPine (Norvasc) 10 mg tablet     Sig: Take 1 tablet (10 mg) by mouth once daily.   calcium carbonate (Os-Gabino) 1,250 mg (500 mg elemental) tablet     Sig: Take 1 tablet by mouth once daily.   cholecalciferol (Vitamin D-3) 25 mcg (1,000 units) capsule     Sig: Take 1 capsule (25 mcg) by mouth once daily.   hydroxychloroquine (Plaquenil) 200 mg tablet     Sig: Take 1 tablet (200 mg) by mouth once daily.   latanoprost (Xalatan) 0.005 % ophthalmic solution     Sig: Administer 1 drop into both eyes once daily at bedtime.   lisinopril 40 mg tablet     Sig: Take 1 tablet (40 mg) by mouth once daily.   risperiDONE (RisperDAL M-TAB) 0.5 mg disintegrating tablet     Sig: Dissolve 1 tablet (0.5 mg) in the mouth once daily.   risperiDONE (RisperDAL) 1 mg tablet     Sig: Take 1 tablet (1 mg) by mouth once daily at bedtime.      Facility-Administered Medications: None       The list below reflectives the updated allergy list. Please review each documented allergy for additional clarification and justification.    No Known Allergies       07/08/25 at 3:21 PM - Astrid Gonsales PharmD

## 2025-07-08 NOTE — PROGRESS NOTES
Care Coordinator Note:    Plan: patient in with fall at [facility] with left pubic FX. Ortho - no surg intervention. WBAT. RADHA- IVF. Crt trending down today, will monitor another night.   Status: inpatient  Payor: medicare  Disposition: re: [facility]. Bedhold   Barrier: CRT  ADOD: tomorrow    Alka Rolando [facility]      07/08/25 1521   Rapid Rounds   Attendance Provider;Care Transitions   Expected Discharge Disposition Inter  ([facility])   Today we still await: Diagnostic workup  (Trending CRT overnight)

## 2025-07-08 NOTE — PROGRESS NOTES
Physical Therapy    Physical Therapy Treatment    Patient Name: Belén Chen  MRN: [id number]  Department: [place] [id number]  Room: [address] [place] [address]  Today's Date: 7/8/2025  Time Calculation  Start Time: 1010  Stop Time: 1030  Time Calculation (min): 20 min         Assessment/Plan   PT Assessment  PT Assessment Results: Decreased strength, Decreased endurance, Impaired balance, Decreased mobility, Decreased cognition, Decreased safety awareness  Rehab Prognosis: Fair  Barriers to Discharge Home: Cognition needs, Physical needs, Caregiver assistance  Caregiver Assistance: Caregiver assistance needed per identified barriers - however, level of patient's required assistance exceeds assistance available at home  Cognition Needs: 24hr supervision for safety awareness needed, Recollection or understanding of home exercise program limited, Insight of patient limited regarding functional ability/needs, Cognition-related high falls risk  Physical Needs: 24hr mobility assistance needed, 24hr ADL assistance needed, High falls risk due to function or environment  Evaluation/Treatment Tolerance: Patient limited by pain  Strengths: Support of Caregivers, Housing layout  Barriers to Participation: Comorbidities  End of Session Communication: Bedside nurse  Assessment Comment: Pt not able to fully particpate due to high pain levels and resistance to movement. Rn gave pain meds during tx session. Pt confused and not aware of reason for pain.  End of Session Patient Position: Bed, 3 rail up, Alarm on     PT Plan  Treatment/Interventions: Bed mobility, Transfer training, Therapeutic exercise  PT Plan: Ongoing PT  PT Frequency: 3 times per week  PT Discharge Recommendations: Moderate intensity level of continued care  Equipment Recommended upon Discharge:  (has ww)  PT Recommended Transfer Status: Assist x1  PT - OK to Discharge: Yes (per POC)    PT Visit Info:  PT Received On: 07/08/25     General Visit Information:   General  Reason for Referral:  Pt admitted from [facility] with an unwitnessed fall. + L incomplete pelvic ring fx.  Family/Caregiver Present: No  Prior to Session Communication: Bedside nurse  Patient Position Received: Bed, 3 rail up, Alarm on  Preferred Learning Style: auditory  General Comment: Pt agreeable to particpate though limited by high pain with all movement.    Subjective   Precautions:  Precautions  LE Weight Bearing Status: Weight Bearing as Tolerated  Medical Precautions: Fall precautions, Oxygen therapy device and L/min            Objective   Pain:  Pain Assessment  Pain Assessment: 0-10  0-10 (Numeric) Pain Score:  (Pt unable to rate. Objectively - very high with movement. Pt tensing muscles to resist  movements due to pain.)  Cognition:  Cognition  Orientation Level: Disoriented to place, Disoriented to time, Disoriented to situation    Activity Tolerance:  Activity Tolerance  Endurance: Tolerates less than 10 min exercise with changes in vital signs  Treatments:  Therapeutic Exercise  Therapeutic Exercise Performed: Yes  Therapeutic Exercise Activity 1: AP with tactile assist, Max cues. ~10 B LE         Bed Mobility  Bed Mobility: Yes  Bed Mobility 1  Bed Mobility 1: Supine to sitting, Sitting to supine  Level of Assistance 1: Maximum assistance, Maximum verbal cues, Maximum tactile cues  Bed Mobility Comments 1: Not fully completed due to pain and resistance. HOB significantly elevated. Cues for hand placement, rail use, though pt not following.  Bed Mobility 2  Bed Mobility  2: Scooting  Level of Assistance 2: Maximum assistance, Maximum verbal cues, Maximum tactile cues  Bed Mobility Comments 2: not fully completed due to pain and resistance.    Ambulation/Gait Training  Ambulation/Gait Training Performed: No       Outcome Measures:  [facility] Basic Mobility  Turning from your back to your side while in a flat bed without using bedrails: A lot  Moving from lying on your back to sitting on the side of a flat bed  without using bedrails: A lot  Moving to and from bed to chair (including a wheelchair): Total  Standing up from a chair using your arms (e.g. wheelchair or bedside chair): A lot  To walk in hospital room: Total  Climbing 3-5 steps with railing: Total  Basic Mobility - Total Score: 9    Education Documentation  Precautions, taught by Queta Hassan PTA at 7/8/2025 11:07 AM.  Learner: Patient  Readiness: Acceptance  Method: Explanation, Demonstration  Response: Needs Reinforcement    Body Mechanics, taught by Queta Hassan PTA at 7/8/2025 11:07 AM.  Learner: Patient  Readiness: Acceptance  Method: Explanation, Demonstration  Response: Needs Reinforcement    Mobility Training, taught by Queta Hassan PTA at 7/8/2025 11:07 AM.  Learner: Patient  Readiness: Acceptance  Method: Explanation, Demonstration  Response: Needs Reinforcement    Education Comments  No comments found.        OP EDUCATION:       Encounter Problems       Encounter Problems (Active)       Balance       STG - Maintains dynamic standing balance with ww with mod A x 5 minutes (Not Progressing)       Start:  07/06/25    Expected End:  07/20/25       INTERVENTIONS:1. Practice standing with minimal support.2. Educate patient about standing tolerance.3. Educate patient about independence with gait, transfers, and ADL's.4. Educate patient about use of assistive device.5. Educate patient about self-directed care.            Mobility       STG - Patient will ambulate with ww 10' with mod A (Not Progressing)       Start:  07/06/25    Expected End:  07/20/25            Increase BLE strength to attain functional goals achieved through supine and seated TE.  (Not Progressing)       Start:  07/06/25    Expected End:  07/20/25               PT Transfers       STG - Transfer from bed to chair with ww with mod A (Not Progressing)       Start:  07/06/25    Expected End:  07/20/25            STG - Patient to transfer to and from sit to supine with min A (Not  Progressing)       Start:  07/06/25    Expected End:  07/20/25            STG - Patient will transfer sit to and from stand with ww with min A (Not Progressing)       Start:  07/06/25    Expected End:  07/20/25

## 2025-07-09 VITALS
RESPIRATION RATE: 17 BRPM | SYSTOLIC BLOOD PRESSURE: 152 MMHG | HEIGHT: 61 IN | HEART RATE: 71 BPM | TEMPERATURE: 98.6 F | OXYGEN SATURATION: 90 % | DIASTOLIC BLOOD PRESSURE: 64 MMHG | BODY MASS INDEX: 19.16 KG/M2 | WEIGHT: 101.5 LBS

## 2025-07-09 PROCEDURE — 99239 HOSP IP/OBS DSCHRG MGMT >30: CPT | Performed by: INTERNAL MEDICINE

## 2025-07-09 PROCEDURE — 2500000001 HC RX 250 WO HCPCS SELF ADMINISTERED DRUGS (ALT 637 FOR MEDICARE OP): Performed by: INTERNAL MEDICINE

## 2025-07-09 PROCEDURE — 2500000004 HC RX 250 GENERAL PHARMACY W/ HCPCS (ALT 636 FOR OP/ED): Performed by: INTERNAL MEDICINE

## 2025-07-09 RX ADMIN — COLCHICINE 0.6 MG: 0.6 TABLET, FILM COATED ORAL at 09:11

## 2025-07-09 RX ADMIN — HYDRALAZINE HYDROCHLORIDE 10 MG: 20 INJECTION INTRAMUSCULAR; INTRAVENOUS at 16:59

## 2025-07-09 RX ADMIN — BISOPROLOL FUMARATE 5 MG: 5 TABLET, FILM COATED ORAL at 09:12

## 2025-07-09 RX ADMIN — AMLODIPINE BESYLATE 10 MG: 10 TABLET ORAL at 09:11

## 2025-07-09 RX ADMIN — PANTOPRAZOLE SODIUM 40 MG: 40 TABLET, DELAYED RELEASE ORAL at 06:19

## 2025-07-09 RX ADMIN — HYDROXYCHLOROQUINE SULFATE 200 MG: 200 TABLET, FILM COATED ORAL at 09:11

## 2025-07-09 RX ADMIN — OXYCODONE HYDROCHLORIDE 5 MG: 5 TABLET ORAL at 16:59

## 2025-07-09 RX ADMIN — HEPARIN SODIUM 5000 UNITS: 5000 INJECTION, SOLUTION INTRAVENOUS; SUBCUTANEOUS at 09:11

## 2025-07-09 RX ADMIN — HYDRALAZINE HYDROCHLORIDE 10 MG: 20 INJECTION INTRAMUSCULAR; INTRAVENOUS at 04:26

## 2025-07-09 ASSESSMENT — PAIN SCALES - GENERAL
PAINLEVEL_OUTOF10: 8
PAINLEVEL_OUTOF10: 0 - NO PAIN
PAINLEVEL_OUTOF10: 5 - MODERATE PAIN
PAINLEVEL_OUTOF10: 0 - NO PAIN

## 2025-07-09 ASSESSMENT — COGNITIVE AND FUNCTIONAL STATUS - GENERAL
CLIMB 3 TO 5 STEPS WITH RAILING: TOTAL
EATING MEALS: A LITTLE
DAILY ACTIVITIY SCORE: 12
MOBILITY SCORE: 6
STANDING UP FROM CHAIR USING ARMS: TOTAL
PERSONAL GROOMING: A LOT
MOVING TO AND FROM BED TO CHAIR: TOTAL
WALKING IN HOSPITAL ROOM: TOTAL
DRESSING REGULAR UPPER BODY CLOTHING: A LITTLE
TURNING FROM BACK TO SIDE WHILE IN FLAT BAD: TOTAL
MOVING FROM LYING ON BACK TO SITTING ON SIDE OF FLAT BED WITH BEDRAILS: TOTAL
HELP NEEDED FOR BATHING: A LOT
TOILETING: TOTAL
DRESSING REGULAR LOWER BODY CLOTHING: TOTAL

## 2025-07-09 ASSESSMENT — PAIN - FUNCTIONAL ASSESSMENT
PAIN_FUNCTIONAL_ASSESSMENT: WONG-BAKER FACES

## 2025-07-09 ASSESSMENT — PAIN DESCRIPTION - ORIENTATION: ORIENTATION: RIGHT

## 2025-07-09 ASSESSMENT — PAIN DESCRIPTION - LOCATION: LOCATION: HIP

## 2025-07-09 NOTE — DISCHARGE SUMMARY
Discharge Diagnosis  Closed fracture of multiple pubic rami, left, initial encounter           Issues Requiring Follow-Up  Follow-up with PCP    Discharge Meds     Medication List      CONTINUE taking these medications     alendronate 70 mg tablet; Commonly known as: Fosamax   amLODIPine 10 mg tablet; Commonly known as: Norvasc   calcium carbonate 1,250 mg (500 mg elemental) tablet; Commonly known as:   Os-Gabino   cholecalciferol 25 mcg (1,000 units) capsule; Commonly known as: Vitamin   D-3   hydroxychloroquine 200 mg tablet; Commonly known as: Plaquenil   latanoprost 0.005 % ophthalmic solution; Commonly known as: Xalatan   lisinopril 40 mg tablet   * risperiDONE 0.5 mg disintegrating tablet; Commonly known as: RisperDAL   M-TAB   * risperiDONE 1 mg tablet; Commonly known as: RisperDAL  * This list has 2 medication(s) that are the same as other medications   prescribed for you. Read the directions carefully, and ask your doctor or   other care provider to review them with you.       Test Results Pending At Discharge  Pending Labs       Order Current Status    Extra Urine Gray Tube Collected (07/05/25 1751)    Urinalysis with Reflex Culture and Microscopic In process            Hospital Course   Belén Chen is a 96 y.o. female with a past medical history of hypertension, metabolic encephalopathy, dementia who presented to [facility] from a nursing facility after an unwitnessed fall.  X-rays of the pelvis and CT of the pelvis demonstrate left superior and inferior pubic rami fractures.  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    Patient was admitted to medicine service, and managed as outlined below:    Unwitnessed mechanical fall at the nursing home resulting in left superior and inferior pubic rami fractures  She is from [facility] with an unwitnessed fall. + L incomplete pelvic ring fx.   Weightbearing as tolerated per  Ortho  Orthopedic consultation  Pain control  PT/OT: Evaluation found decreased strength, endurance, impaired balance, decreased mobility, decreased cognition and safety awareness.  Recommendation was for moderate intensity level of continue care.  Needs assistance to for transfer.     Hypertensive Urgency  Restart amlodipine 10 mg orally daily  Zebeta 5 mg orally daily  Hydralazine as needed  Lisinopril 40 mg orally daily, on hold due to RADHA     RADHA  -Cr now downward trending after slight bump  -hold lisinopril  -IV fluids  -recheck in am     Gout  Colchicine 0.6 mg daily    On July 9, 2025, patient was found stable for discharge back to her facility.  Greater than 30 minutes were dedicated to this discharge that included clinical discussion with family, educating patient and coordinating care with TCC.    Pertinent Physical Exam At Time of Discharge  Physical Exam  Constitutional: Pleasant and slightly confused, from likely dementia, alert active, cooperative not in acute distress  Eyes: PERRLA, clear sclera  ENMT: Moist mucosal membranes, no exudate  Head / Neck: Atraumatic, normocephalic, supple neck, JVP not visualized  Lungs: Patent airways, CTABL  Heart: RRR, S1S2, no murmurs appreciated, palpable pulses in all extremities  GI: Soft, NT, ND, bowel sounds present in all quadrants  MSK: Moves all extremities freely, no restriction  of ROM, no joint edema  Extremities: Intact x 4, no peripheral edema  : No Manzo catheter inserted  Breast: Deferred  Neurological: AAO x 3 to person, place and date, facial muscles symmetrical, sensation intact, strength 4/4, no acute focal neurological deficits appreciated  Psychological: Appropriate mood and behavior  Outpatient Follow-Up  No future appointments.      Devang Dennison DO

## 2025-07-09 NOTE — ASSESSMENT & PLAN NOTE
96 y.o. female with a past medical history of hypertension, metabolic encephalopathy, dementia who presented to [facility] from a nursing facility after an unwitnessed fall.  X-rays of the pelvis and CT of the pelvis demonstrate left superior and inferior pubic rami fractures.     Unwitnessed mechanical fall at the nursing home resulting in left superior and inferior pubic rami fractures  Weightbearing as tolerated per Ortho  Orthopedic consultation  Pain control  PT/OT     Hypertensive Urgency  Restart amlodipine 10 mg orally daily  Zebeta 5 mg orally daily  Hydralazine as needed  Lisinopril 40 mg orally daily, on hold due to RADHA     RADHA  -Cr now downward trending after slight bump  -hold lisinopril  -IV fluids  -recheck in am     Gout  Colchicine 0.6 mg daily     DVT prophylaxis  Heparin 5000 units subcutaneously every 8 hours  SCDs    Presenting with fracture of the pubic rami status post mechanical fall, need further management, discharge likely tomorrow if RADHA continues to improve.

## 2025-07-09 NOTE — PROGRESS NOTES
07/09/25 1546   Discharge Planning   Expected Discharge Disposition SNF  ([facility])   Does the patient need discharge transport arranged? No   [facility] coordination needed? No   Has discharge transport been arranged? Yes   What day is the transport expected? 07/09/25   What time is the transport expected? 1730         SW informed patient's son, Luciano, of patient's transport to [facility] at 5:30pm this evening.

## 2025-07-09 NOTE — PROGRESS NOTES
Belén Chen is a 96 y.o. female on day 2 of admission presenting with Closed fracture of multiple pubic rami, left, initial encounter.      Subjective   Patient was seen and examined bedside this morning, verbal, but slightly confused, and did not have any symptom at that time.       Objective     Last Recorded Vitals  /64 (BP Location: Right arm, Patient Position: Lying)   Pulse 63   Temp 36.6 °C (97.8 °F) (Temporal)   Resp 16   Wt 46 kg (101 lb 8 oz)   SpO2 94%   Intake/Output last 3 Shifts:    Intake/Output Summary (Last 24 hours) at 7/8/2025 2037  Last data filed at 7/8/2025 0812  Gross per 24 hour   Intake 455 ml   Output 150 ml   Net 305 ml       Admission Weight  Weight: 46 kg (101 lb 8 oz) (07/05/25 2148)    Daily Weight  07/05/25 : 46 kg (101 lb 8 oz)    Image Results  Electrocardiogram, 12-lead PRN ACS symptoms  Sinus rhythm with Premature supraventricular complexes  Right bundle branch block  Abnormal ECG  When compared with ECG of 05-JUL-2025 12:51, (unconfirmed)  Premature supraventricular complexes are now Present  ECG 12 lead  Normal sinus rhythm  Right bundle branch block  Inferior infarct , age undetermined  Abnormal ECG  When compared with ECG of 10-NOV-2021 14:20,  Left anterior fascicular block is no longer Present  Inferior infarct is now Present      Physical Exam  Constitutional: Pleasant and slightly confused, from likely dementia, alert active, cooperative not in acute distress  Eyes: PERRLA, clear sclera  ENMT: Moist mucosal membranes, no exudate  Head / Neck: Atraumatic, normocephalic, supple neck, JVP not visualized  Lungs: Patent airways, CTABL  Heart: RRR, S1S2, no murmurs appreciated, palpable pulses in all extremities  GI: Soft, NT, ND, bowel sounds present in all quadrants  MSK: Moves all extremities freely, no restriction  of ROM, no joint edema  Extremities: Intact x 4, no peripheral edema  : No Manzo catheter inserted  Breast: Deferred  Neurological: AAO x 3 to  person, place and date, facial muscles symmetrical, sensation intact, strength 4/4, no acute focal neurological deficits appreciated  Psychological: Appropriate mood and behavior    Relevant Results             Scheduled medications  Scheduled Medications[1]  Continuous medications  Continuous Medications[2]  PRN medications  PRN Medications[3]       Assessment & Plan  Closed fracture of multiple pubic rami, left, initial encounter    Pubic ramus fracture, left, closed, initial encounter (Multi)   96 y.o. female with a past medical history of hypertension, metabolic encephalopathy, dementia who presented to [facility] from a nursing facility after an unwitnessed fall.  X-rays of the pelvis and CT of the pelvis demonstrate left superior and inferior pubic rami fractures.     Unwitnessed mechanical fall at the nursing home resulting in left superior and inferior pubic rami fractures  Weightbearing as tolerated per Ortho  Orthopedic consultation  Pain control  PT/OT     Hypertensive Urgency  Restart amlodipine 10 mg orally daily  Zebeta 5 mg orally daily  Hydralazine as needed  Lisinopril 40 mg orally daily, on hold due to RADHA     RADHA  -Cr now downward trending after slight bump  -hold lisinopril  -IV fluids  -recheck in am     Gout  Colchicine 0.6 mg daily     DVT prophylaxis  Heparin 5000 units subcutaneously every 8 hours  SCDs    Presenting with fracture of the pubic rami status post mechanical fall, need further management, discharge likely tomorrow if RADHA continues to improve.             Devang Dennison DO           [1] amLODIPine, 10 mg, oral, Daily  bisoprolol, 5 mg, oral, Daily  colchicine, 0.6 mg, oral, Daily  heparin (porcine), 5,000 Units, subcutaneous, q12h DAMON  hydroxychloroquine, 200 mg, oral, Daily  latanoprost, 1 drop, Both Eyes, Nightly  [Held by provider] lisinopril, 40 mg, oral, Daily  pantoprazole, 40 mg, oral, Daily before breakfast  risperiDONE, 1 mg, oral, Nightly  [2]    [3] PRN  medications: acetaminophen **OR** acetaminophen **OR** acetaminophen, hydrALAZINE, ondansetron **OR** ondansetron, oxyCODONE

## 2025-07-09 NOTE — CARE PLAN
The patient's goals for the shift include      The clinical goals for the shift include safety and remain HDS

## 2025-07-28 LAB
ATRIAL RATE: 70 BPM
ATRIAL RATE: 98 BPM
P AXIS: 14 DEGREES
P OFFSET: 182 MS
P OFFSET: 192 MS
P ONSET: 125 MS
P ONSET: 128 MS
PR INTERVAL: 170 MS
PR INTERVAL: 178 MS
Q ONSET: 213 MS
Q ONSET: 214 MS
QRS COUNT: 11 BEATS
QRS COUNT: 16 BEATS
QRS DURATION: 132 MS
QRS DURATION: 132 MS
QT INTERVAL: 380 MS
QT INTERVAL: 416 MS
QTC CALCULATION(BAZETT): 449 MS
QTC CALCULATION(BAZETT): 485 MS
QTC FREDERICIA: 438 MS
QTC FREDERICIA: 447 MS
R AXIS: -15 DEGREES
R AXIS: -15 DEGREES
T AXIS: -16 DEGREES
T AXIS: -9 DEGREES
T OFFSET: 403 MS
T OFFSET: 422 MS
VENTRICULAR RATE: 70 BPM
VENTRICULAR RATE: 98 BPM